# Patient Record
Sex: FEMALE | Race: WHITE | Employment: OTHER | URBAN - METROPOLITAN AREA
[De-identification: names, ages, dates, MRNs, and addresses within clinical notes are randomized per-mention and may not be internally consistent; named-entity substitution may affect disease eponyms.]

---

## 2021-06-07 ENCOUNTER — OFFICE VISIT (OUTPATIENT)
Dept: FAMILY MEDICINE CLINIC | Age: 66
End: 2021-06-07
Payer: MEDICARE

## 2021-06-07 VITALS
DIASTOLIC BLOOD PRESSURE: 80 MMHG | WEIGHT: 205.4 LBS | SYSTOLIC BLOOD PRESSURE: 130 MMHG | HEIGHT: 64 IN | HEART RATE: 84 BPM | OXYGEN SATURATION: 97 % | BODY MASS INDEX: 35.07 KG/M2

## 2021-06-07 DIAGNOSIS — F32.A DEPRESSION, UNSPECIFIED DEPRESSION TYPE: ICD-10-CM

## 2021-06-07 DIAGNOSIS — Z76.89 ENCOUNTER TO ESTABLISH CARE: ICD-10-CM

## 2021-06-07 DIAGNOSIS — E78.5 HYPERLIPIDEMIA, UNSPECIFIED HYPERLIPIDEMIA TYPE: ICD-10-CM

## 2021-06-07 DIAGNOSIS — I10 HYPERTENSION, UNSPECIFIED TYPE: Primary | ICD-10-CM

## 2021-06-07 DIAGNOSIS — E11.9 TYPE 2 DIABETES MELLITUS WITHOUT COMPLICATION, WITHOUT LONG-TERM CURRENT USE OF INSULIN (HCC): ICD-10-CM

## 2021-06-07 DIAGNOSIS — Z13.29 SCREENING FOR THYROID DISORDER: ICD-10-CM

## 2021-06-07 DIAGNOSIS — E55.9 VITAMIN D DEFICIENCY: ICD-10-CM

## 2021-06-07 DIAGNOSIS — Z13.21 ENCOUNTER FOR VITAMIN DEFICIENCY SCREENING: ICD-10-CM

## 2021-06-07 DIAGNOSIS — Z11.59 ENCOUNTER FOR HEPATITIS C SCREENING TEST FOR LOW RISK PATIENT: ICD-10-CM

## 2021-06-07 LAB
A/G RATIO: 1.8 (ref 1.1–2.2)
ALBUMIN SERPL-MCNC: 5 G/DL (ref 3.4–5)
ALP BLD-CCNC: 127 U/L (ref 40–129)
ALT SERPL-CCNC: 19 U/L (ref 10–40)
ANION GAP SERPL CALCULATED.3IONS-SCNC: 20 MMOL/L (ref 3–16)
AST SERPL-CCNC: 19 U/L (ref 15–37)
BILIRUB SERPL-MCNC: 0.3 MG/DL (ref 0–1)
BUN BLDV-MCNC: 18 MG/DL (ref 7–20)
CALCIUM SERPL-MCNC: 10.3 MG/DL (ref 8.3–10.6)
CHLORIDE BLD-SCNC: 102 MMOL/L (ref 99–110)
CHOLESTEROL, TOTAL: 164 MG/DL (ref 0–199)
CO2: 21 MMOL/L (ref 21–32)
CREAT SERPL-MCNC: 0.8 MG/DL (ref 0.6–1.2)
GFR AFRICAN AMERICAN: >60
GFR NON-AFRICAN AMERICAN: >60
GLOBULIN: 2.8 G/DL
GLUCOSE BLD-MCNC: 153 MG/DL (ref 70–99)
HCT VFR BLD CALC: 40 % (ref 36–48)
HDLC SERPL-MCNC: 52 MG/DL (ref 40–60)
HEMOGLOBIN: 14 G/DL (ref 12–16)
HEPATITIS C ANTIBODY INTERPRETATION: NORMAL
LDL CHOLESTEROL CALCULATED: 87 MG/DL
MCH RBC QN AUTO: 32.5 PG (ref 26–34)
MCHC RBC AUTO-ENTMCNC: 35 G/DL (ref 31–36)
MCV RBC AUTO: 93 FL (ref 80–100)
PDW BLD-RTO: 13.7 % (ref 12.4–15.4)
PLATELET # BLD: 419 K/UL (ref 135–450)
PMV BLD AUTO: 8.3 FL (ref 5–10.5)
POTASSIUM SERPL-SCNC: 4.4 MMOL/L (ref 3.5–5.1)
RBC # BLD: 4.3 M/UL (ref 4–5.2)
SODIUM BLD-SCNC: 143 MMOL/L (ref 136–145)
TOTAL PROTEIN: 7.8 G/DL (ref 6.4–8.2)
TRIGL SERPL-MCNC: 125 MG/DL (ref 0–150)
TSH REFLEX: 0.43 UIU/ML (ref 0.27–4.2)
VITAMIN D 25-HYDROXY: 46.1 NG/ML
VLDLC SERPL CALC-MCNC: 25 MG/DL
WBC # BLD: 8.2 K/UL (ref 4–11)

## 2021-06-07 PROCEDURE — G8427 DOCREV CUR MEDS BY ELIG CLIN: HCPCS | Performed by: NURSE PRACTITIONER

## 2021-06-07 PROCEDURE — G8417 CALC BMI ABV UP PARAM F/U: HCPCS | Performed by: NURSE PRACTITIONER

## 2021-06-07 PROCEDURE — 4040F PNEUMOC VAC/ADMIN/RCVD: CPT | Performed by: NURSE PRACTITIONER

## 2021-06-07 PROCEDURE — 36415 COLL VENOUS BLD VENIPUNCTURE: CPT | Performed by: NURSE PRACTITIONER

## 2021-06-07 PROCEDURE — 3017F COLORECTAL CA SCREEN DOC REV: CPT | Performed by: NURSE PRACTITIONER

## 2021-06-07 PROCEDURE — 3046F HEMOGLOBIN A1C LEVEL >9.0%: CPT | Performed by: NURSE PRACTITIONER

## 2021-06-07 PROCEDURE — 1090F PRES/ABSN URINE INCON ASSESS: CPT | Performed by: NURSE PRACTITIONER

## 2021-06-07 PROCEDURE — 1036F TOBACCO NON-USER: CPT | Performed by: NURSE PRACTITIONER

## 2021-06-07 PROCEDURE — 2022F DILAT RTA XM EVC RTNOPTHY: CPT | Performed by: NURSE PRACTITIONER

## 2021-06-07 PROCEDURE — 99204 OFFICE O/P NEW MOD 45 MIN: CPT | Performed by: NURSE PRACTITIONER

## 2021-06-07 PROCEDURE — G8400 PT W/DXA NO RESULTS DOC: HCPCS | Performed by: NURSE PRACTITIONER

## 2021-06-07 PROCEDURE — 1123F ACP DISCUSS/DSCN MKR DOCD: CPT | Performed by: NURSE PRACTITIONER

## 2021-06-07 RX ORDER — PAROXETINE HYDROCHLORIDE 20 MG/1
20 TABLET, FILM COATED ORAL EVERY MORNING
COMMUNITY
Start: 2021-04-05 | End: 2021-06-07 | Stop reason: SDUPTHER

## 2021-06-07 RX ORDER — PAROXETINE HYDROCHLORIDE 20 MG/1
20 TABLET, FILM COATED ORAL EVERY MORNING
Qty: 90 TABLET | Refills: 1 | Status: SHIPPED | OUTPATIENT
Start: 2021-06-07 | End: 2021-12-08 | Stop reason: SDUPTHER

## 2021-06-07 RX ORDER — GEMFIBROZIL 600 MG/1
600 TABLET, FILM COATED ORAL 2 TIMES DAILY
Qty: 180 TABLET | Refills: 1 | Status: SHIPPED | OUTPATIENT
Start: 2021-06-07 | End: 2021-12-08 | Stop reason: SDUPTHER

## 2021-06-07 RX ORDER — ATORVASTATIN CALCIUM 20 MG/1
20 TABLET, FILM COATED ORAL DAILY
Qty: 90 TABLET | Refills: 1 | Status: SHIPPED | OUTPATIENT
Start: 2021-06-07 | End: 2021-12-08 | Stop reason: SDUPTHER

## 2021-06-07 RX ORDER — GLIPIZIDE 10 MG/1
10 TABLET, FILM COATED, EXTENDED RELEASE ORAL DAILY
COMMUNITY
Start: 2021-03-01 | End: 2021-06-07 | Stop reason: SDUPTHER

## 2021-06-07 RX ORDER — METOPROLOL TARTRATE 50 MG/1
50 TABLET, FILM COATED ORAL 2 TIMES DAILY
COMMUNITY
Start: 2021-04-10 | End: 2021-06-07 | Stop reason: SDUPTHER

## 2021-06-07 RX ORDER — ATORVASTATIN CALCIUM 20 MG/1
20 TABLET, FILM COATED ORAL DAILY
COMMUNITY
Start: 2021-04-10 | End: 2021-06-07 | Stop reason: SDUPTHER

## 2021-06-07 RX ORDER — GLIPIZIDE 10 MG/1
10 TABLET, FILM COATED, EXTENDED RELEASE ORAL DAILY
Qty: 90 TABLET | Refills: 1 | Status: SHIPPED | OUTPATIENT
Start: 2021-06-07 | End: 2021-12-08 | Stop reason: SDUPTHER

## 2021-06-07 RX ORDER — GEMFIBROZIL 600 MG/1
600 TABLET, FILM COATED ORAL 2 TIMES DAILY
COMMUNITY
Start: 2021-04-10 | End: 2021-06-07 | Stop reason: SDUPTHER

## 2021-06-07 RX ORDER — METOPROLOL TARTRATE 50 MG/1
50 TABLET, FILM COATED ORAL 2 TIMES DAILY
Qty: 180 TABLET | Refills: 1 | Status: SHIPPED | OUTPATIENT
Start: 2021-06-07 | End: 2021-12-08 | Stop reason: SDUPTHER

## 2021-06-07 ASSESSMENT — PATIENT HEALTH QUESTIONNAIRE - PHQ9
1. LITTLE INTEREST OR PLEASURE IN DOING THINGS: 0
2. FEELING DOWN, DEPRESSED OR HOPELESS: 2
SUM OF ALL RESPONSES TO PHQ QUESTIONS 1-9: 2
SUM OF ALL RESPONSES TO PHQ9 QUESTIONS 1 & 2: 2
SUM OF ALL RESPONSES TO PHQ QUESTIONS 1-9: 2
SUM OF ALL RESPONSES TO PHQ QUESTIONS 1-9: 2

## 2021-06-07 NOTE — PROGRESS NOTES
mouth every morning 90 tablet 1     No current facility-administered medications for this visit. Patient's past medical history, surgical history, family history, medications,  and allergies  were all reviewed and updated as appropriate today. Review of Systems   All other systems reviewed and are negative. Physical Exam  Vitals and nursing note reviewed. Constitutional:       Appearance: Normal appearance. She is well-developed. She is obese. HENT:      Head: Normocephalic. Right Ear: External ear normal.      Left Ear: External ear normal.      Nose: Nose normal.      Mouth/Throat:      Mouth: Mucous membranes are moist.      Pharynx: Oropharynx is clear. No oropharyngeal exudate or posterior oropharyngeal erythema. Eyes:      Extraocular Movements: Extraocular movements intact. Conjunctiva/sclera: Conjunctivae normal.      Pupils: Pupils are equal, round, and reactive to light. Neck:      Thyroid: No thyromegaly. Cardiovascular:      Rate and Rhythm: Normal rate and regular rhythm. Heart sounds: Normal heart sounds. No murmur heard. Pulmonary:      Effort: Pulmonary effort is normal.      Breath sounds: Normal breath sounds. No wheezing or rhonchi. Abdominal:      General: Bowel sounds are normal.      Palpations: Abdomen is soft. There is no mass. Tenderness: There is no abdominal tenderness. Musculoskeletal:         General: Normal range of motion. Cervical back: Normal range of motion and neck supple. No tenderness. Lymphadenopathy:      Cervical: No cervical adenopathy. Skin:     General: Skin is warm and dry. Neurological:      General: No focal deficit present. Mental Status: She is alert and oriented to person, place, and time. Psychiatric:         Mood and Affect: Mood normal.         Behavior: Behavior normal.         Thought Content:  Thought content normal.         Judgment: Judgment normal.       Vitals:    06/07/21 1022   BP: 130/80   Pulse: 84   SpO2: 97%       Assessment:  Encounter Diagnoses   Name Primary?  Hypertension, unspecified type Yes    Encounter to establish care     Hyperlipidemia, unspecified hyperlipidemia type     Type 2 diabetes mellitus without complication, without long-term current use of insulin ([facility]     Depression, unspecified depression type     Encounter for hepatitis C screening test for low risk patient     Screening for thyroid disorder     Encounter for vitamin deficiency screening        Controlled SubstancesMonitoring:  NA    Plan:  1. Hypertension, unspecified type  Stable  - metoprolol tartrate (LOPRESSOR) 50 MG tablet; Take 1 tablet by mouth 2 times daily  Dispense: 180 tablet; Refill: 1  - CBC  - Comprehensive Metabolic Panel    2. Encounter to establish care  Establish care    3. Hyperlipidemia, unspecified hyperlipidemia type  Stable  - atorvastatin (LIPITOR) 20 MG tablet; Take 1 tablet by mouth daily  Dispense: 90 tablet; Refill: 1  - gemfibrozil (LOPID) 600 MG tablet; Take 1 tablet by mouth 2 times daily  Dispense: 180 tablet; Refill: 1  - CBC  - Lipid Panel    4. Type 2 diabetes mellitus without complication, without long-term current use of insulin ([facility])  Stable  - glipiZIDE (GLUCOTROL XL) 10 MG extended release tablet; Take 1 tablet by mouth daily  Dispense: 90 tablet; Refill: 1  - metFORMIN (GLUCOPHAGE) 500 MG tablet; Take 2 tablets by mouth 2 times daily (with meals)  Dispense: 360 tablet; Refill: 1  - Comprehensive Metabolic Panel  - Hemoglobin A1C    5. Depression, unspecified depression type  Stable  - PARoxetine (PAXIL) 20 MG tablet; Take 1 tablet by mouth every morning  Dispense: 90 tablet; Refill: 1    6. Encounter for hepatitis C screening test for low risk patient  - Hepatitis C Antibody    7. Screening for thyroid disorder  - TSH with Reflex    8.  Encounter for vitamin deficiency screening  - Vitamin D 25 Hydroxy    I told her we would call the results of her labs to her tomorrow  Follow-up in 6 months for AWV    INA Michaels CNP, NAHUN    Reviewed treatment plan with patient. Patient verbalized understanding to treatment plan and questions were answered. [address] [place] [address] Ame Solomon Peña.  [address] [place] [address]

## 2021-06-08 LAB
ESTIMATED AVERAGE GLUCOSE: 142.7 MG/DL
HBA1C MFR BLD: 6.6 %

## 2021-11-24 ENCOUNTER — PATIENT MESSAGE (OUTPATIENT)
Dept: FAMILY MEDICINE CLINIC | Age: 66
End: 2021-11-24

## 2021-11-29 NOTE — TELEPHONE ENCOUNTER
From: Kenji Sung  To: Langladediego Gutierrez  Sent: 11/24/2021 7:53 PM EST  Subject: Non-Urgent Medical Question    I am having wellness tests done on 12/8. I would like to know what tests I will have and where these tests will be done. I can't drive and need to arrange for transportation if it is out of that office.

## 2021-12-08 DIAGNOSIS — E78.5 HYPERLIPIDEMIA, UNSPECIFIED HYPERLIPIDEMIA TYPE: ICD-10-CM

## 2021-12-08 DIAGNOSIS — F32.A DEPRESSION, UNSPECIFIED DEPRESSION TYPE: ICD-10-CM

## 2021-12-08 DIAGNOSIS — E11.9 TYPE 2 DIABETES MELLITUS WITHOUT COMPLICATION, WITHOUT LONG-TERM CURRENT USE OF INSULIN (HCC): ICD-10-CM

## 2021-12-08 DIAGNOSIS — I10 HYPERTENSION, UNSPECIFIED TYPE: ICD-10-CM

## 2021-12-08 RX ORDER — GEMFIBROZIL 600 MG/1
600 TABLET, FILM COATED ORAL 2 TIMES DAILY
Qty: 180 TABLET | Refills: 1 | Status: SHIPPED | OUTPATIENT
Start: 2021-12-08 | End: 2022-06-14 | Stop reason: SDUPTHER

## 2021-12-08 RX ORDER — PAROXETINE HYDROCHLORIDE 20 MG/1
20 TABLET, FILM COATED ORAL EVERY MORNING
Qty: 90 TABLET | Refills: 1 | Status: SHIPPED | OUTPATIENT
Start: 2021-12-08 | End: 2022-06-14 | Stop reason: SDUPTHER

## 2021-12-08 RX ORDER — METOPROLOL TARTRATE 50 MG/1
50 TABLET, FILM COATED ORAL 2 TIMES DAILY
Qty: 180 TABLET | Refills: 1 | Status: SHIPPED | OUTPATIENT
Start: 2021-12-08 | End: 2022-06-14 | Stop reason: SDUPTHER

## 2021-12-08 RX ORDER — GLIPIZIDE 10 MG/1
10 TABLET, FILM COATED, EXTENDED RELEASE ORAL DAILY
Qty: 90 TABLET | Refills: 1 | Status: SHIPPED | OUTPATIENT
Start: 2021-12-08 | End: 2022-06-14 | Stop reason: SDUPTHER

## 2021-12-08 RX ORDER — ATORVASTATIN CALCIUM 20 MG/1
20 TABLET, FILM COATED ORAL DAILY
Qty: 90 TABLET | Refills: 1 | Status: SHIPPED | OUTPATIENT
Start: 2021-12-08 | End: 2022-06-14 | Stop reason: SDUPTHER

## 2021-12-08 NOTE — TELEPHONE ENCOUNTER
----- Message from Cherie Nasir sent at 12/8/2021 10:36 AM EST -----  Subject: Refill Request    QUESTIONS  Name of Medication? PARoxetine (PAXIL) 20 MG tablet  Patient-reported dosage and instructions? 20 mg tablet one tablet daily  How many days do you have left? 14  Preferred Pharmacy? [facility] phone number (if available)? [phone]  ---------------------------------------------------------------------------  --------------,  Name of Medication? metFORMIN (GLUCOPHAGE) 500 MG tablet  Patient-reported dosage and instructions? 500 mg tablet one four tablets   daily  How many days do you have left? 28  Preferred Pharmacy? [facility] phone number (if available)? [phone]  ---------------------------------------------------------------------------  --------------,  Name of Medication? atorvastatin (LIPITOR) 20 MG tablet  Patient-reported dosage and instructions? 20 mg tablet on tablet daily  How many days do you have left? 14  Preferred Pharmacy? [facility] phone number (if available)? [phone]  ---------------------------------------------------------------------------  --------------,  Name of Medication? glipiZIDE (GLUCOTROL XL) 10 MG extended release tablet  Patient-reported dosage and instructions? 10 mg tablet two daily  How many days do you have left? 0  Preferred Pharmacy? [facility] phone number (if available)? [phone]  ---------------------------------------------------------------------------  --------------,  Name of Medication? gemfibrozil (LOPID) 600 MG tablet  Patient-reported dosage and instructions? 600 mg tablet two tablets daily  How many days do you have left? 0  Preferred Pharmacy?  [facility] phone number (if available)? [phone]  ---------------------------------------------------------------------------  --------------,  Name

## 2021-12-08 NOTE — TELEPHONE ENCOUNTER
Last Office Visit  -  6/7/21  Next Office Visit  -  3/8/22 TL to transfer care    Last Filled  -    Last UDS -    Contract -

## 2021-12-08 NOTE — TELEPHONE ENCOUNTER
Patient phoned concerned her meds will not get filled. She cancelled her appointment today afraid she might fall on the ice. She rescheduled to 3/8/2022.   Patient is requesting a call once rx are sent to [facility] mail order

## 2021-12-23 ENCOUNTER — TELEPHONE (OUTPATIENT)
Dept: FAMILY MEDICINE CLINIC | Age: 66
End: 2021-12-23

## 2021-12-23 DIAGNOSIS — Z12.31 ENCOUNTER FOR SCREENING MAMMOGRAM FOR MALIGNANT NEOPLASM OF BREAST: Primary | ICD-10-CM

## 2021-12-23 DIAGNOSIS — Z92.89 HISTORY OF PAPANICOLAOU SMEAR OF CERVIX: ICD-10-CM

## 2021-12-23 NOTE — TELEPHONE ENCOUNTER
Patient was phoned to clarify message. Patient is requesting a referral for a preventive mammogram and gynecology . Patient move to the area in April 2020 and is trying to get all her preventative appointment taken care of.        YUMIKO with Erma 3/8/22

## 2021-12-23 NOTE — TELEPHONE ENCOUNTER
----- Message from Iesha Castorena sent at 12/23/2021 11:09 AM EST -----  Subject: Message to Provider    QUESTIONS  Information for Provider? [address], patient scheduled appt   01/04/22 for [facility] MAMMO RM 2-- has [employer] MEDICARE, gold plus--- they will   cover the wendy if it is done in doctor's office, if done at [facility]   she has a 35.00 specialist fee, needs referral --- patient needs   appointment sooner than 3/8, wants referral to obgyn and one for a wendy  ---------------------------------------------------------------------------  --------------  [address]  What is the best way for the office to contact you? OK to leave message on   voicemail  Preferred Call Back Phone Number? [phone]  ---------------------------------------------------------------------------  --------------  SCRIPT ANSWERS  Relationship to Patient?  Self

## 2022-01-04 ENCOUNTER — HOSPITAL ENCOUNTER (OUTPATIENT)
Dept: WOMENS IMAGING | Age: 67
Discharge: HOME | End: 2022-01-04
Payer: MEDICARE

## 2022-01-04 VITALS — HEIGHT: 60 IN | WEIGHT: 220 LBS | BODY MASS INDEX: 43.19 KG/M2

## 2022-01-04 DIAGNOSIS — Z12.31 ENCOUNTER FOR SCREENING MAMMOGRAM FOR MALIGNANT NEOPLASM OF BREAST: ICD-10-CM

## 2022-01-04 PROCEDURE — 77067 SCR MAMMO BI INCL CAD: CPT

## 2022-01-28 ENCOUNTER — TELEPHONE (OUTPATIENT)
Dept: FAMILY MEDICINE CLINIC | Age: 67
End: 2022-01-28

## 2022-03-08 ENCOUNTER — OFFICE VISIT (OUTPATIENT)
Dept: FAMILY MEDICINE CLINIC | Age: 67
End: 2022-03-08
Payer: MEDICARE

## 2022-03-08 ENCOUNTER — CARE COORDINATION (OUTPATIENT)
Dept: CARE COORDINATION | Age: 67
End: 2022-03-08

## 2022-03-08 VITALS
HEART RATE: 71 BPM | DIASTOLIC BLOOD PRESSURE: 78 MMHG | WEIGHT: 209.9 LBS | BODY MASS INDEX: 41.21 KG/M2 | OXYGEN SATURATION: 97 % | HEIGHT: 60 IN | SYSTOLIC BLOOD PRESSURE: 124 MMHG

## 2022-03-08 DIAGNOSIS — Z13.31 POSITIVE DEPRESSION SCREENING: ICD-10-CM

## 2022-03-08 DIAGNOSIS — Z23 NEED FOR VACCINATION: ICD-10-CM

## 2022-03-08 DIAGNOSIS — E11.69 TYPE 2 DIABETES MELLITUS WITH OTHER SPECIFIED COMPLICATION, WITHOUT LONG-TERM CURRENT USE OF INSULIN (HCC): ICD-10-CM

## 2022-03-08 DIAGNOSIS — E66.01 OBESITY, CLASS III, BMI 40-49.9 (MORBID OBESITY) (HCC): ICD-10-CM

## 2022-03-08 DIAGNOSIS — F32.A DEPRESSION, UNSPECIFIED DEPRESSION TYPE: ICD-10-CM

## 2022-03-08 DIAGNOSIS — Z78.0 POSTMENOPAUSAL: ICD-10-CM

## 2022-03-08 DIAGNOSIS — K63.5 POLYP OF COLON, UNSPECIFIED PART OF COLON, UNSPECIFIED TYPE: ICD-10-CM

## 2022-03-08 DIAGNOSIS — I10 HYPERTENSION, UNSPECIFIED TYPE: ICD-10-CM

## 2022-03-08 DIAGNOSIS — J44.9 CHRONIC OBSTRUCTIVE PULMONARY DISEASE, UNSPECIFIED COPD TYPE (HCC): ICD-10-CM

## 2022-03-08 DIAGNOSIS — Z00.00 WELCOME TO MEDICARE PREVENTIVE VISIT: Primary | ICD-10-CM

## 2022-03-08 PROBLEM — E11.9 TYPE 2 DIABETES MELLITUS (HCC): Status: ACTIVE | Noted: 2022-03-08

## 2022-03-08 PROBLEM — E11.9 TYPE 2 DIABETES MELLITUS WITHOUT COMPLICATION, WITHOUT LONG-TERM CURRENT USE OF INSULIN (HCC): Status: ACTIVE | Noted: 2022-03-08

## 2022-03-08 LAB
A/G RATIO: 2 (ref 1.1–2.2)
ALBUMIN SERPL-MCNC: 4.7 G/DL (ref 3.4–5)
ALP BLD-CCNC: 127 U/L (ref 40–129)
ALT SERPL-CCNC: 30 U/L (ref 10–40)
ANION GAP SERPL CALCULATED.3IONS-SCNC: 20 MMOL/L (ref 3–16)
AST SERPL-CCNC: 24 U/L (ref 15–37)
BILIRUB SERPL-MCNC: 0.3 MG/DL (ref 0–1)
BUN BLDV-MCNC: 19 MG/DL (ref 7–20)
CALCIUM SERPL-MCNC: 10.1 MG/DL (ref 8.3–10.6)
CHLORIDE BLD-SCNC: 98 MMOL/L (ref 99–110)
CO2: 21 MMOL/L (ref 21–32)
CREAT SERPL-MCNC: 0.8 MG/DL (ref 0.6–1.2)
GFR AFRICAN AMERICAN: >60
GFR NON-AFRICAN AMERICAN: >60
GLUCOSE BLD-MCNC: 356 MG/DL (ref 70–99)
POTASSIUM SERPL-SCNC: 4.6 MMOL/L (ref 3.5–5.1)
SODIUM BLD-SCNC: 139 MMOL/L (ref 136–145)
TOTAL PROTEIN: 7.1 G/DL (ref 6.4–8.2)

## 2022-03-08 PROCEDURE — 1090F PRES/ABSN URINE INCON ASSESS: CPT | Performed by: REGISTERED NURSE

## 2022-03-08 PROCEDURE — G8417 CALC BMI ABV UP PARAM F/U: HCPCS | Performed by: REGISTERED NURSE

## 2022-03-08 PROCEDURE — 1123F ACP DISCUSS/DSCN MKR DOCD: CPT | Performed by: REGISTERED NURSE

## 2022-03-08 PROCEDURE — G8400 PT W/DXA NO RESULTS DOC: HCPCS | Performed by: REGISTERED NURSE

## 2022-03-08 PROCEDURE — 4040F PNEUMOC VAC/ADMIN/RCVD: CPT | Performed by: REGISTERED NURSE

## 2022-03-08 PROCEDURE — 1036F TOBACCO NON-USER: CPT | Performed by: REGISTERED NURSE

## 2022-03-08 PROCEDURE — 99214 OFFICE O/P EST MOD 30 MIN: CPT | Performed by: REGISTERED NURSE

## 2022-03-08 PROCEDURE — G8484 FLU IMMUNIZE NO ADMIN: HCPCS | Performed by: REGISTERED NURSE

## 2022-03-08 PROCEDURE — 36415 COLL VENOUS BLD VENIPUNCTURE: CPT | Performed by: REGISTERED NURSE

## 2022-03-08 PROCEDURE — G8427 DOCREV CUR MEDS BY ELIG CLIN: HCPCS | Performed by: REGISTERED NURSE

## 2022-03-08 PROCEDURE — G0402 INITIAL PREVENTIVE EXAM: HCPCS | Performed by: REGISTERED NURSE

## 2022-03-08 PROCEDURE — 3046F HEMOGLOBIN A1C LEVEL >9.0%: CPT | Performed by: REGISTERED NURSE

## 2022-03-08 PROCEDURE — 3023F SPIROM DOC REV: CPT | Performed by: REGISTERED NURSE

## 2022-03-08 PROCEDURE — 3017F COLORECTAL CA SCREEN DOC REV: CPT | Performed by: REGISTERED NURSE

## 2022-03-08 PROCEDURE — 2022F DILAT RTA XM EVC RTNOPTHY: CPT | Performed by: REGISTERED NURSE

## 2022-03-08 RX ORDER — NYSTATIN 100000 U/G
CREAM TOPICAL PRN
COMMUNITY
Start: 2022-01-20

## 2022-03-08 ASSESSMENT — PATIENT HEALTH QUESTIONNAIRE - PHQ9
SUM OF ALL RESPONSES TO PHQ QUESTIONS 1-9: 16
10. IF YOU CHECKED OFF ANY PROBLEMS, HOW DIFFICULT HAVE THESE PROBLEMS MADE IT FOR YOU TO DO YOUR WORK, TAKE CARE OF THINGS AT HOME, OR GET ALONG WITH OTHER PEOPLE: 1
3. TROUBLE FALLING OR STAYING ASLEEP: 3
SUM OF ALL RESPONSES TO PHQ QUESTIONS 1-9: 16
8. MOVING OR SPEAKING SO SLOWLY THAT OTHER PEOPLE COULD HAVE NOTICED. OR THE OPPOSITE, BEING SO FIGETY OR RESTLESS THAT YOU HAVE BEEN MOVING AROUND A LOT MORE THAN USUAL: 2
5. POOR APPETITE OR OVEREATING: 2
1. LITTLE INTEREST OR PLEASURE IN DOING THINGS: 2
2. FEELING DOWN, DEPRESSED OR HOPELESS: 2
SUM OF ALL RESPONSES TO PHQ9 QUESTIONS 1 & 2: 4
7. TROUBLE CONCENTRATING ON THINGS, SUCH AS READING THE NEWSPAPER OR WATCHING TELEVISION: 0
6. FEELING BAD ABOUT YOURSELF - OR THAT YOU ARE A FAILURE OR HAVE LET YOURSELF OR YOUR FAMILY DOWN: 3
SUM OF ALL RESPONSES TO PHQ QUESTIONS 1-9: 16
4. FEELING TIRED OR HAVING LITTLE ENERGY: 2
9. THOUGHTS THAT YOU WOULD BE BETTER OFF DEAD, OR OF HURTING YOURSELF: 0
SUM OF ALL RESPONSES TO PHQ QUESTIONS 1-9: 16

## 2022-03-08 ASSESSMENT — ENCOUNTER SYMPTOMS
DIARRHEA: 0
NAUSEA: 0
CONSTIPATION: 0
COUGH: 0
ABDOMINAL PAIN: 0
SHORTNESS OF BREATH: 0

## 2022-03-08 ASSESSMENT — LIFESTYLE VARIABLES: HOW OFTEN DO YOU HAVE A DRINK CONTAINING ALCOHOL: NEVER

## 2022-03-08 NOTE — PROGRESS NOTES
Patient: Joni Rahman is a 78 y.o. female who presents today with the following Chief Complaint(s):  Chief Complaint   Patient presents with    Medicare AWV    New Patient       Assessment/Plan:  1. Welcome to Medicare preventive visit  See additional note for AWV.    2. Obesity, Class III, BMI 40-49.9 (morbid obesity) ([facility]  Increase exercise. Dietary changes. 3. Postmenopausal    - DEXA BONE DENSITY 2 SITES; Future    4. Type 2 diabetes mellitus with other specified complication, without long-term current use of insulin (HCC)  Stable. - Hemoglobin A1C    5. Hypertension, unspecified type  Stable. Continue current medications  - Comprehensive Metabolic Panel    6. Depression, unspecified depression type  Continue therapy every other week. She does not want to increase her medication at this time. Encouraged her to seek hobbies or positive social groups to interact with. Will refer to care coordinator for resources    7. Chronic obstructive pulmonary disease, unspecified COPD type ([facility]  Stable. 8. Polyp of colon, unspecified part of colon, unspecified type  Schedule follow-up with gastroenterology. - [facility] - Sanchez Sosa DO, Gastroenterology, [facility]    9. Positive depression screening  Plan is to refer to care coordinator for resources    10. Need for vaccination  She is due for Tdap and pneumococcal.  She is also due for her second shingles vaccination. She will go to her pharmacy for vaccinations. Return in 6 months (on 9/8/2022), or if symptoms worsen or fail to improve, for Medicare Annual Wellness Visit in 1 year, Diabetes follow up, BP check. HPI:     She is here to establish care. She is also due for an AWV. She would like to know which vaccination she is due for. She moved to this area from [place] in April 2021. She says she has had 2 or 3 colonoscopies previously. She has a history of colon polyps. She is not having current issues with her bowels.     She does have urinary incontinence. She says it affects her approximately 1 time per day. She feels that she has this managed well at this time. She is having issues with depression. She declines an increase in her medication. She says most of her issues revolve around financial problems and feeling lonely. Current Outpatient Medications   Medication Sig Dispense Refill    nystatin (MYCOSTATIN) 199327 UNIT/GM cream       atorvastatin (LIPITOR) 20 MG tablet Take 1 tablet by mouth daily 90 tablet 1    gemfibrozil (LOPID) 600 MG tablet Take 1 tablet by mouth 2 times daily 180 tablet 1    glipiZIDE (GLUCOTROL XL) 10 MG extended release tablet Take 1 tablet by mouth daily 90 tablet 1    metFORMIN (GLUCOPHAGE) 500 MG tablet Take 2 tablets by mouth 2 times daily (with meals) 360 tablet 1    metoprolol tartrate (LOPRESSOR) 50 MG tablet Take 1 tablet by mouth 2 times daily 180 tablet 1    PARoxetine (PAXIL) 20 MG tablet Take 1 tablet by mouth every morning 90 tablet 1     No current facility-administered medications for this visit. Review of Systems   Constitutional: Negative for fatigue and fever. HENT: Negative. Eyes: Negative for visual disturbance. Respiratory: Negative for cough and shortness of breath. Cardiovascular: Negative for chest pain and palpitations. Gastrointestinal: Negative for abdominal pain, constipation, diarrhea and nausea. Endocrine: Negative. Genitourinary:        Urinary incontinence. Musculoskeletal: Negative. Skin: Negative. Neurological: Negative for dizziness, light-headedness and headaches. Psychiatric/Behavioral: Positive for dysphoric mood. Negative for self-injury, sleep disturbance and suicidal ideas. The patient is not nervous/anxious. Vitals:    03/08/22 1049   BP: 124/78   Pulse: 71   SpO2: 97%   Weight: 209 lb 14.4 oz (95.2 kg)   Height: 5' (1.524 m)     Physical Exam  Vitals reviewed.    Constitutional:       General: She is not in acute distress. Appearance: Normal appearance. She is not ill-appearing. HENT:      Head: Normocephalic and atraumatic. Right Ear: Tympanic membrane, ear canal and external ear normal. There is no impacted cerumen. Left Ear: Tympanic membrane, ear canal and external ear normal. There is no impacted cerumen. Nose: Nose normal. No congestion or rhinorrhea. Mouth/Throat:      Mouth: Mucous membranes are moist.      Pharynx: Oropharynx is clear. No oropharyngeal exudate or posterior oropharyngeal erythema. Eyes:      General:         Right eye: No discharge. Left eye: No discharge. Extraocular Movements: Extraocular movements intact. Conjunctiva/sclera: Conjunctivae normal.      Pupils: Pupils are equal, round, and reactive to light. Cardiovascular:      Rate and Rhythm: Normal rate and regular rhythm. Pulses: Normal pulses. Heart sounds: Normal heart sounds. No murmur heard. No friction rub. No gallop. Pulmonary:      Effort: Pulmonary effort is normal.      Breath sounds: Normal breath sounds. No stridor. No wheezing, rhonchi or rales. Abdominal:      General: Abdomen is flat. There is no distension. Palpations: Abdomen is soft. There is no mass. Tenderness: There is no abdominal tenderness. There is no guarding or rebound. Hernia: No hernia is present. Musculoskeletal:         General: Normal range of motion. Cervical back: Normal range of motion and neck supple. No tenderness. Right lower leg: No edema. Left lower leg: No edema. Lymphadenopathy:      Cervical: No cervical adenopathy. Skin:     General: Skin is warm and dry. Capillary Refill: Capillary refill takes less than 2 seconds. Coloration: Skin is not jaundiced or pale. Findings: No erythema or rash. Neurological:      General: No focal deficit present. Mental Status: She is alert and oriented to person, place, and time.    Psychiatric:

## 2022-03-08 NOTE — PATIENT INSTRUCTIONS
Personalized Preventive Plan for Iza Tillman - 3/8/2022  Medicare offers a range of preventive health benefits. Some of the tests and screenings are paid in full while other may be subject to a deductible, co-insurance, and/or copay. Some of these benefits include a comprehensive review of your medical history including lifestyle, illnesses that may run in your family, and various assessments and screenings as appropriate. After reviewing your medical record and screening and assessments performed today your provider may have ordered immunizations, labs, imaging, and/or referrals for you. A list of these orders (if applicable) as well as your Preventive Care list are included within your After Visit Summary for your review. Other Preventive Recommendations:    · A preventive eye exam performed by an eye specialist is recommended every 1-2 years to screen for glaucoma; cataracts, macular degeneration, and other eye disorders. · A preventive dental visit is recommended every 6 months. · Try to get at least 150 minutes of exercise per week or 10,000 steps per day on a pedometer . · Order or download the FREE \"Exercise & Physical Activity: Your Everyday Guide\" from [employer]. Call 2-745.530.4082 or search [employer] online. · You need 6191-8002 mg of calcium and 4180-8740 IU of vitamin D per day. It is possible to meet your calcium requirement with diet alone, but a vitamin D supplement is usually necessary to meet this goal.  · When exposed to the sun, use a sunscreen that protects against both UVA and UVB radiation with an SPF of 30 or greater. Reapply every 2 to 3 hours or after sweating, drying off with a towel, or swimming. · Always wear a seat belt when traveling in a car. Always wear a helmet when riding a bicycle or motorcycle.     Due for Tdap, 2nd dose of Shingles, Pneumococcal vaccines

## 2022-03-08 NOTE — CARE COORDINATION
ACM did a reach out to patient per PCP referral for social connections. ACM introduced CC and ACM role. Patient is a friendly 78year old female who moved to the area around a year ago. She lives with her disabled daughter (44yrs). Patient does not drive, due to financial reasons of not owing a vehicle, and does not have a license. Patient is on a fix income with custodial. At this time patient has no services though the [facility]. Patient is able to afford and take her medications with no barriers currently. Patient is a well controlled diabetic current A1C was 6.2. ACM asked patient what connections she was looking for. Patient would like community resources for local senior activities and transportation. ACM advised patient she will do a [facility] referral and send other resources like [employer] to take some home gym classes and [employer] for senior center programs. Also sent the link for [employer] of the elderly [place]. [facility] provided the patient with her contact information to reach out if she had any questions. Patient thanked [facility] for the call.

## 2022-03-08 NOTE — Clinical Note
Patient moved to the area less than a year ago. She is having difficulty making social connections.   She would benefit from resources

## 2022-03-08 NOTE — PROGRESS NOTES
Medicare Annual Wellness Visit    Yane Garcia is here for Medicare AWV and New Patient    [address] [facility] was seen today for medicare awv and new patient. Diagnoses and all orders for this visit:    Welcome to Medicare preventive visit    Obesity, Class III, BMI 40-49.9 (morbid obesity) (HCC)    Postmenopausal  -     DEXA BONE DENSITY 2 SITES; Future    Type 2 diabetes mellitus with other specified complication, without long-term current use of insulin (HCC)  -     Hemoglobin A1C    Hypertension, unspecified type  -     Comprehensive Metabolic Panel    Depression, unspecified depression type    Chronic obstructive pulmonary disease, unspecified COPD type ([facility]    Polyp of colon, unspecified part of colon, unspecified type  -     [facility] - Tom Mart DO, Gastroenterology, [facility]    Positive depression screening         Recommendations for Preventive Services Due: see orders and patient instructions/AVS.  Recommended screening schedule for the next 5-10 years is provided to the patient in written form: see Patient Instructions/AVS.     Return in 6 months (on 9/8/2022), or if symptoms worsen or fail to improve, for Medicare Annual Wellness Visit in 1 year, Diabetes follow up, BP check. Subjective   The following acute and/or chronic problems were also addressed today:  See additional note. Patient's complete Health Risk Assessment and screening values have been reviewed and are found in Flowsheets. The following problems were reviewed today and where indicated follow up appointments were made and/or referrals ordered. Positive Risk Factor Screenings with Interventions:      Depression:  PHQ-2 Score: 4  PHQ-9 Total Score: 16    Severity:1-4 = minimal depression, 5-9 = mild depression, 10-14 = moderate depression, 15-19 = moderately severe depression, 20-27 = severe depression    Depression Interventions:  · She has a virtual therapy appointment every 2 weeks.  She says that most of her issues are related to being lonely. She has a lot of stressors related to financial and family situations. General Health and ACP:  General  In general, how would you say your health is?: Fair  In the past 7 days, have you experienced any of the following: New or Increased Pain, New or Increased Fatigue, Loneliness, Social Isolation, Stress or Anger?: (!) Yes  Select all that apply: (!) Loneliness  Do you get the social and emotional support that you need?: (!) No  Do you have a Living Will?: (!) No    Advance Directives     Power of [facility] Will ACP-Advance Directive ACP-Power of     Not on File Not on File Not on File Not on File      General Health Risk Interventions:  · she is trying to join a senior center to help with her depression. Health Habits/Nutrition:     Physical Activity: Insufficiently Active    Days of Exercise per Week: 2 days    Minutes of Exercise per Session: 30 min     Have you lost any weight without trying in the past 3 months?: No  Body mass index: (!) 40.99  Have you seen the dentist within the past year?: (!) No    Health Habits/Nutrition Interventions:  · Dental exam overdue:  patient encouraged to make appointment with his/her dentist    Hearing/Vision:  Do you or your family notice any trouble with your hearing that hasn't been managed with hearing aids?: (!) Yes  Do you have difficulty driving, watching TV, or doing any of your daily activities because of your eyesight?: No  Have you had an eye exam within the past year?: Yes  No exam data present    Hearing/Vision Interventions:  · Hearing concerns:  patient declines any further evaluation/treatment for hearing issues. She has tinnitus and manages this okay for now. Objective   Vitals:    03/08/22 1049   BP: 124/78   Pulse: 71   SpO2: 97%   Weight: 209 lb 14.4 oz (95.2 kg)   Height: 5' (1.524 m)      Body mass index is 40.99 kg/m².         General Appearance: alert and oriented to person, place and time, well developed and well- nourished, in no acute distress  Skin: warm and dry, no rash or erythema  Head: normocephalic and atraumatic  Eyes: pupils equal, round, and reactive to light, extraocular eye movements intact, conjunctivae normal  ENT: tympanic membrane, external ear and ear canal normal bilaterally, nose without deformity, nasal mucosa and turbinates normal without polyps  Neck: supple and non-tender without mass, no thyromegaly or thyroid nodules, no cervical lymphadenopathy  Pulmonary/Chest: clear to auscultation bilaterally- no wheezes, rales or rhonchi, normal air movement, no respiratory distress  Cardiovascular: normal rate, regular rhythm, normal S1 and S2, no murmurs, rubs, clicks, or gallops, distal pulses intact, no carotid bruits  Abdomen: soft, non-tender, non-distended, normal bowel sounds, no masses or organomegaly  Extremities: no cyanosis, clubbing or edema  Musculoskeletal: normal range of motion, no joint swelling, deformity or tenderness  Neurologic: reflexes normal and symmetric, no cranial nerve deficit, gait, coordination and speech normal       No Known Allergies  Prior to Visit Medications    Medication Sig Taking?  Authorizing Provider   nystatin (MYCOSTATIN) 046153 UNIT/GM cream  Yes Historical Provider, MD   atorvastatin (LIPITOR) 20 MG tablet Take 1 tablet by mouth daily Yes INA Molina CNP   gemfibrozil (LOPID) 600 MG tablet Take 1 tablet by mouth 2 times daily Yes INA Bashir CNP   glipiZIDE (GLUCOTROL XL) 10 MG extended release tablet Take 1 tablet by mouth daily Yes INA Molina CNP   metFORMIN (GLUCOPHAGE) 500 MG tablet Take 2 tablets by mouth 2 times daily (with meals) Yes INA Bashir CNP   metoprolol tartrate (LOPRESSOR) 50 MG tablet Take 1 tablet by mouth 2 times daily Yes INA Bashir CNP   PARoxetine (PAXIL) 20 MG tablet Take 1 tablet by mouth every morning Yes INA Bashir CNP CareTeam (Including outside providers/suppliers regularly involved in providing care):   Patient Care Team:  INA Cruz CNP as PCP - General (Family Medicine)  INA Cruz CNP as PCP - [facility] Empaneled Provider    Reviewed and updated this visit:  Tobacco  Allergies  Meds  Problems  Med Hx  Surg Hx  Soc Hx  Fam Hx                   PHQ-9 score today: (PHQ-9 Total Score: 16), additional evaluation and assessment performed, follow-up plan includes but not limited to: Medication management and Referral to /Specialist  for evaluation and management.

## 2022-03-09 DIAGNOSIS — E11.9 TYPE 2 DIABETES MELLITUS WITHOUT COMPLICATION, WITHOUT LONG-TERM CURRENT USE OF INSULIN (HCC): ICD-10-CM

## 2022-03-09 LAB
ESTIMATED AVERAGE GLUCOSE: 185.8 MG/DL
HBA1C MFR BLD: 8.1 %

## 2022-03-09 NOTE — TELEPHONE ENCOUNTER
I received an email back from the [facility] this morning. They contacted the patient to offer services and the patient declined services.

## 2022-03-15 ENCOUNTER — CLINICAL DOCUMENTATION (OUTPATIENT)
Dept: SPIRITUAL SERVICES | Age: 67
End: 2022-03-15

## 2022-03-23 ENCOUNTER — CLINICAL DOCUMENTATION (OUTPATIENT)
Dept: SPIRITUAL SERVICES | Age: 67
End: 2022-03-23

## 2022-03-24 ENCOUNTER — CARE COORDINATION (OUTPATIENT)
Dept: CARE COORDINATION | Age: 67
End: 2022-03-24

## 2022-03-24 SDOH — ECONOMIC STABILITY: INCOME INSECURITY: IN THE LAST 12 MONTHS, WAS THERE A TIME WHEN YOU WERE NOT ABLE TO PAY THE MORTGAGE OR RENT ON TIME?: NO

## 2022-03-24 SDOH — ECONOMIC STABILITY: HOUSING INSECURITY: IN THE LAST 12 MONTHS, HOW MANY PLACES HAVE YOU LIVED?: 1

## 2022-03-24 SDOH — ECONOMIC STABILITY: HOUSING INSECURITY
IN THE LAST 12 MONTHS, WAS THERE A TIME WHEN YOU DID NOT HAVE A STEADY PLACE TO SLEEP OR SLEPT IN A SHELTER (INCLUDING NOW)?: NO

## 2022-03-24 SDOH — ECONOMIC STABILITY: TRANSPORTATION INSECURITY
IN THE PAST 12 MONTHS, HAS LACK OF TRANSPORTATION KEPT YOU FROM MEETINGS, WORK, OR FROM GETTING THINGS NEEDED FOR DAILY LIVING?: YES

## 2022-03-24 SDOH — ECONOMIC STABILITY: FOOD INSECURITY: WITHIN THE PAST 12 MONTHS, THE FOOD YOU BOUGHT JUST DIDN'T LAST AND YOU DIDN'T HAVE MONEY TO GET MORE.: NEVER TRUE

## 2022-03-24 SDOH — ECONOMIC STABILITY: TRANSPORTATION INSECURITY
IN THE PAST 12 MONTHS, HAS THE LACK OF TRANSPORTATION KEPT YOU FROM MEDICAL APPOINTMENTS OR FROM GETTING MEDICATIONS?: YES

## 2022-03-24 SDOH — ECONOMIC STABILITY: FOOD INSECURITY: WITHIN THE PAST 12 MONTHS, YOU WORRIED THAT YOUR FOOD WOULD RUN OUT BEFORE YOU GOT MONEY TO BUY MORE.: NEVER TRUE

## 2022-03-24 ASSESSMENT — SOCIAL DETERMINANTS OF HEALTH (SDOH)
HOW HARD IS IT FOR YOU TO PAY FOR THE VERY BASICS LIKE FOOD, HOUSING, MEDICAL CARE, AND HEATING?: SOMEWHAT HARD
HOW OFTEN DO YOU GET TOGETHER WITH FRIENDS OR RELATIVES?: NEVER
HOW OFTEN DO YOU ATTEND CHURCH OR RELIGIOUS SERVICES?: NEVER
HOW OFTEN DO YOU ATTENT MEETINGS OF THE CLUB OR ORGANIZATION YOU BELONG TO?: NEVER
DO YOU BELONG TO ANY CLUBS OR ORGANIZATIONS SUCH AS CHURCH GROUPS UNIONS, FRATERNAL OR ATHLETIC GROUPS, OR SCHOOL GROUPS?: NO
IN A TYPICAL WEEK, HOW MANY TIMES DO YOU TALK ON THE PHONE WITH FAMILY, FRIENDS, OR NEIGHBORS?: ONCE A WEEK

## 2022-03-24 NOTE — CARE COORDINATION
Ambulatory Care Coordination Note  3/24/2022  CM Risk Score: 1  Charlson 10 Year Mortality Risk Score: 23%     ACC: Olu Gomez, RN    Summary Note: ACM follow up from previous call to patient to offer CC. ACM explained the role of the ACM and CC. Patient agreed to work with ACM on goals toward lowering her A1C and finding community resources. Patient was very friendly and stated that she has already started making diet modifications to lower her A1C. Patient test BS but did not have most current readings. ACM asked patient to start recording the readings so we can discuss at the next follow up. ACM advised patient she will also be sending some education for the patient to help with meal planning and healthy snack choices. Patient thanked ACM. ACM went though SDOH with patient and patient stated she is frustranted with not qualifying for any resournces. Patient has a large custodial fund that she does not have access to for the most part. She stated when she has applied for benefits they take that as income and not understanding that the patient does not have that money as far as needs right now. Patient states that she is unable to afford things like vaccines, and medical services and even her taxes due to the financial burden. Patient states that she provides care to her disabled daughter who also gets some income for disability but she does not bring in enough even to get the filtration systems she needs for her health. ACM advised patient she will send her come community resources to try and find help for things as far as vaccines and other services. Patient thanked ACM. ACM advised she will have this mailed out and that we will discuss at the next follow up. Plan     POC to PCP   Send community resources on local health department and [employer]  Send information on local [employer] and [employer]'s   [employer] transportation resource  Follow Up in two weeks         Ambulatory Care Coordination Assessment    Care Coordination Protocol  Program Enrollment: Rising Risk  Referral from Primary Care Provider: Yes  Week 1 - Initial Assessment     Do you have all of your prescriptions and are they filled?: Yes  Barriers to medication adherence: None  Are you able to afford your medications?: No     Do you have Home O2 Therapy?: No      Ability to seek help/take action for Emergent Urgent situations i.e. fire, crime, inclement weather or health crisis. : Independent  Ability to ambulate to restroom: Independent  Ability handle personal hygeine needs (bathing/dressing/grooming): Independent  Ability to manage Medications: Independent  Ability to prepare Food Preparation: Independent  Ability to maintain home (clean home, laundry): Independent  Ability to drive and/or has transportation: Dependent  Ability to do shopping: Independent  Ability to manage finances: Independent  Is patient able to live independently?: Yes     Current Housing: Private Residence                 Suggested Interventions and Community Resources  Diabetes Education: In Process (Comment: Zone Tools)    Medication Assistance Program: In Process   Senior Services: Completed   Transportation Services: In Process   Zone Management Tools: In Process                  Prior to Admission medications    Medication Sig Start Date End Date Taking?  Authorizing Provider   nystatin (MYCOSTATIN) 784209 UNIT/GM cream  1/20/22   Historical Provider, MD   atorvastatin (LIPITOR) 20 MG tablet Take 1 tablet by mouth daily 12/8/21   INA Altamirano CNP   gemfibrozil (LOPID) 600 MG tablet Take 1 tablet by mouth 2 times daily 12/8/21   INA Altamirano CNP   glipiZIDE (GLUCOTROL XL) 10 MG extended release tablet Take 1 tablet by mouth daily 12/8/21   INA Altamirano CNP   metFORMIN (GLUCOPHAGE) 500 MG tablet Take 2 tablets by mouth 2 times daily (with meals) 12/8/21   INA Moyer CNP   metoprolol tartrate (LOPRESSOR) 50 MG tablet Take 1 tablet by mouth 2 times daily 12/8/21   INA Bhatt CNP   PARoxetine (PAXIL) 20 MG tablet Take 1 tablet by mouth every morning 12/8/21   INA Tong CNP       Future Appointments   Date Time Provider Tyler Torre   4/13/2022 12:00 PM HARLEY HORVATH [facility] [place]   9/8/2022 11:00 AM Erma Iftikhar Dukes, APRN - CNP [facility] [place] [facility] [place]     ,   Diabetes Assessment    Medic Alert ID: No  Meal Planning: Avoidance of concentrated sweets   How often do you test your blood sugar?: Daily   Do you have barriers with adherence to non-pharmacologic self-management interventions?  (Nutrition/Exercise/Self-Monitoring): Yes   Have you ever had to go to the ED for symptoms of low blood sugar?: No           and   General Assessment

## 2022-04-04 ENCOUNTER — CLINICAL DOCUMENTATION (OUTPATIENT)
Dept: SPIRITUAL SERVICES | Age: 67
End: 2022-04-04

## 2022-04-04 NOTE — ACP (ADVANCE CARE PLANNING)
Advance Care Planning   Ambulatory ACP Specialist Patient Outreach    Date:  4/4/2022  ACP Specialist:  Evens Garcia    Outreach call to patient in follow-up to ACP Specialist referral from: INA Avelar CNP    [x] PCP  [] Provider   [] Ambulatory Care Management [] Other for Reason:    [x] Advance Directive Assistance  [] Code Status Discussion  [] Complete Portable DNR Order  [] Discuss Goals of Care  [] Complete POST/MOST  [] Early ACP Decision-Making  [] Other    Date Referral Received: 3/9/2022    Today's Outreach:  [] First   [] Second  [x] Third                               Third outreach made by [x]  phone  [] email []   Wipebookt     Intervention:  [] Spoke with Patient  [x] Left VM requesting return call      Outcome: Left pt a reminder for next week's AD appointment. Next Step:   [x] ACP scheduled conversation  [] Outreach again in one week               [] Email / Mail ACP Info Sheets  [] Email / Mail Advance Directive            [] Close Referral. Routing closure to referring provider/staff and to ACP Specialist .      Thank you for this referral.

## 2022-04-07 ENCOUNTER — CARE COORDINATION (OUTPATIENT)
Dept: CARE COORDINATION | Age: 67
End: 2022-04-07

## 2022-04-07 NOTE — CARE COORDINATION
ALEJANDROM contacted the patient back once speaking to Venus did have a resource through [employer] which ACM gave to the patient on return call. Patient has a very large penitentiary that prevents her from receiving any help though JFS and COA. Due to her penitentiary fund, patient may need to discuss with an  on her next steps. ALEJANDROM also provided the patient with the phone number to [employer] to see if any resources they have would be available to the patient. Lastly ALEJANDROM sent via [employer] a two page list of services available to patients in [address] [place] [address]. ALEJANDROM explained to the patient how to access the attachment and that each resource describes what they help with and phone numbers to contact. The patient was very thankful for the return call. ALEJANDROM advised she will reach out to the patient in a few weeks to discuss her health and assess for further needs in CC. Patient in agreement to this.     Plan     Follow up two weeks  Assess DM   Assess further needs

## 2022-04-07 NOTE — CARE COORDINATION
Ambulatory Care Coordination Note  4/7/2022  CM Risk Score: 1  Charlson 10 Year Mortality Risk Score: 23%     ACC: Amarjit Portillo, RN    Summary Note: ACM outreach to patient to insure she received the resources that were sent to her. Patient said she had not. ACM advised she sent via [employer] but will also have sent via mail again. Patient stated she is struggling financially since moving from [facility]. Where she lives there is not public transportation daily, and she does not have money to use other options like uber. Patient has a large alf fund that has prevented her from receiving any public assistance. Patient does not have the funds available to get medical treatments or vaccines. AC did provide the patient with the [address] number for vaccines, but the patient is unable to get there. Patient has an upcoming appointment for a colonoscopy and she is fearful of the cost associated. AC did provide the patient with the number to [facility] billing to work out a payment plan. Patient stated she didn't have money for a payment plan. Patient stated she also can not complete her taxes due to her computer not working. Patient states that she is struggling to survive on her fix income combined with her daughters income. Her daughter is on disability and also does not receive services. The patient and the daughter walk everywhere. Patient uses her credit care to get food, but has limited funds to pay this off each month. Patient was very upset throughout the call. ACM offered to speak to our SW to see if there were other services they may know of for the patient. ACM advised she will call the patient back this afternoon to discuss what information the  may have to offer. Patient in agreement to this. ACM will also touch base on her DM this afternoon when return call made. Patient was to upset about finances at the time of the call to discuss anything else at that time. Care Coordination Interventions    Program Enrollment: Rising Risk  Referral from Primary Care Provider: Yes  Suggested Interventions and Community Resources  Diabetes Education: In Process (Comment: Zone Tools)  Medication Assistance Program: In Process (Comment: Juanita Hummel. [facility] department for vaccines)  Senior Services: Completed (Comment: COA referral )  Transportation Support: In Process  Zone Management Tools: In Process         Goals Addressed    None         Prior to Admission medications    Medication Sig Start Date End Date Taking?  Authorizing Provider   nystatin (MYCOSTATIN) 966960 UNIT/GM cream Apply topically as needed  1/20/22   Historical Provider, MD   atorvastatin (LIPITOR) 20 MG tablet Take 1 tablet by mouth daily 12/8/21   INA Green CNP   gemfibrozil (LOPID) 600 MG tablet Take 1 tablet by mouth 2 times daily 12/8/21   INA Green CNP   glipiZIDE (GLUCOTROL XL) 10 MG extended release tablet Take 1 tablet by mouth daily 12/8/21   INA Green CNP   metFORMIN (GLUCOPHAGE) 500 MG tablet Take 2 tablets by mouth 2 times daily (with meals) 12/8/21   INA Degroot CNP   metoprolol tartrate (LOPRESSOR) 50 MG tablet Take 1 tablet by mouth 2 times daily 12/8/21   INA Green CNP   PARoxetine (PAXIL) 20 MG tablet Take 1 tablet by mouth every morning 12/8/21   INA Degroot CNP       Future Appointments   Date Time Provider Tyler Torre   4/13/2022 12:00 PM HARLEY HORVATH  1 [facility] [address] [place] [address]   9/8/2022 11:00 AM INA Degroot CNP [facility] - [place]

## 2022-04-13 ENCOUNTER — CLINICAL DOCUMENTATION (OUTPATIENT)
Dept: SPIRITUAL SERVICES | Age: 67
End: 2022-04-13

## 2022-04-13 ENCOUNTER — HOSPITAL ENCOUNTER (OUTPATIENT)
Dept: WOMENS IMAGING | Age: 67
Discharge: HOME | End: 2022-04-13
Payer: MEDICARE

## 2022-04-13 DIAGNOSIS — Z78.0 POSTMENOPAUSAL: ICD-10-CM

## 2022-04-13 PROCEDURE — 77080 DXA BONE DENSITY AXIAL: CPT

## 2022-04-22 ENCOUNTER — CARE COORDINATION (OUTPATIENT)
Dept: CARE COORDINATION | Age: 67
End: 2022-04-22

## 2022-04-22 NOTE — CARE COORDINATION
ACM outreach to patient to touch base. Patient did not answer. ACM left a message for patient to return call. ACM follow up at a later date.

## 2022-04-27 ENCOUNTER — CARE COORDINATION (OUTPATIENT)
Dept: CARE COORDINATION | Age: 67
End: 2022-04-27

## 2022-04-27 NOTE — CARE COORDINATION
ACM received a [employer] message from patient stating she is unable to afford her colonoscopy or her shingles vaccine. ALEJANDROM responded with additional resources for the patient providing her with [address] and [address] [employer] [facility] assistance phone numbers. See Patient note from 4.7.22 for details   Will follow up at a later date.

## 2022-05-04 ENCOUNTER — CARE COORDINATION (OUTPATIENT)
Dept: CARE COORDINATION | Age: 67
End: 2022-05-04

## 2022-05-04 NOTE — CARE COORDINATION
Ambulatory Care Coordination Note  5/4/2022  CM Risk Score: 2  Charlson 10 Year Mortality Risk Score: 23%     ACC: Chela Otto RN    Summary Note: ACM outreach to patient to [facility]. Patient stated she did receive the resources sent by [facility] in her mychart and thanked [facility]. Patient noted that she has been walking a lot more now that the weather is nice, and noted that her legs feel stronger. Patient noted she has not been testing her BS due to not having any supplies. ACM discussed that her A1C was elevated and she will send a note to PCP to see if testing supplies can be sent to the pharmacy. Patient thanked AC. Patient noted that she watches her diet to avoid sweets and carbs when possible. [facility] will send patient some DM meal ideas via [employer] for her to have on hand. Patient thanked [facility]. ACM asked if patient has any other concerns or needs at this time and patient declined. Plan     PCP note about BS supplies  Send DM meal ideas via [employer]  Follow Up in two weeks  Assess for further needs            Care Coordination Interventions    Program Enrollment: Rising Risk  Referral from Primary Care Provider: Yes  Suggested Interventions and Community Resources  Diabetes Education: In Process (Comment: Zone Tools)  Medication Assistance Program: In Process (Comment: Carol Hummelemerson. [facility] department for vaccines)  Senior Services: Completed (Comment: COA referral )  Transportation Support: In Process  Zone Management Tools: In Process         Goals Addressed                 This Visit's Progress     COMPLETED: Community Resource Goal        I will complete referral to [facility] on Aging (Senior Services), [employer], and Medication Assistance for assistance. I will complete application for assistance to [address] [place] [address] on Aging (Senior Services), [employer], and Medication Assistance for [employer].      Barriers: fear of failure, financial, and lack of support  Plan for overcoming my barriers: ACM  Confidence: 7/10  Anticipated Goal Completion Date: 6.3.22    Patient completed application but was denied services due to income. 5.3.22 AC       Conditions and Symptoms   On track     I will schedule office visits, as directed by my provider. I will keep my appointment or reschedule if I have to cancel. I will notify my provider of any barriers to my plan of care. I will follow my Zone Management tool to seek urgent or emergent care. I will notify my provider of any symptoms that indicate a worsening of my condition. Barriers: lack of motivation and lack of support  Plan for overcoming my barriers: PCP and ACM  Confidence: 8/10  Anticipated Goal Completion Date: 6.3.22         Self Monitoring        Self-Monitored Blood Glucose - I will check my blood sugar Fasting blood sugar  I will notify my provider of any trends of increasing or decreasing blood sugars over a 1 month period. I will notify my provider if I have any blood sugar readings less than 70 more than 2 times a month. None Recently Recorded    Barriers: lack of support and overwhelmed by complexity of regimen  Plan for overcoming my barriers: ACM and PCP  Confidence: 8/10  Anticipated Goal Completion Date: 6.3.22    Patient notes she is not able to test BS due to not having testing supplies. ACM will reach out to PCP to have this sent to pharmacy. 5.3. 22AC              Prior to Admission medications    Medication Sig Start Date End Date Taking?  Authorizing Provider   nystatin (MYCOSTATIN) [id number] UNIT/GM cream Apply topically as needed  1/20/22  Yes Historical Provider, MD   atorvastatin (LIPITOR) 20 MG tablet Take 1 tablet by mouth daily 12/8/21  Yes INA Osborne CNP   gemfibrozil (LOPID) 600 MG tablet Take 1 tablet by mouth 2 times daily 12/8/21  Yes INA Osborne CNP   glipiZIDE (GLUCOTROL XL) 10 MG extended release tablet Take 1 tablet by mouth daily 12/8/21  Yes INA Christiansen CNP   metFORMIN (GLUCOPHAGE) 500 MG tablet Take 2 tablets by mouth 2 times daily (with meals) 12/8/21  Yes INA Christiansen CNP   metoprolol tartrate (LOPRESSOR) 50 MG tablet Take 1 tablet by mouth 2 times daily 12/8/21  Yes INA Christiansen CNP   PARoxetine (PAXIL) 20 MG tablet Take 1 tablet by mouth every morning 12/8/21  Yes Ellamae Holter, APRN - CNP       Future Appointments   Date Time Provider Tyler Torre   9/8/2022 11:00 AM INA Christiansen CNP [facility] [place] [facility] [place]      and   Diabetes Assessment    Medic Alert ID: No  Meal Planning: Avoidance of concentrated sweets   How often do you test your blood sugar?: No Testing (Comment: patient does not have testing supplies. )   Do you have barriers with adherence to non-pharmacologic self-management interventions?  (Nutrition/Exercise/Self-Monitoring): Yes   Have you ever had to go to the ED for symptoms of low blood sugar?: No

## 2022-05-05 NOTE — TELEPHONE ENCOUNTER
I don't recommend blood glucose monitoring at this time. Nutrition and exercise changes are adequate. She can discuss with me at next visit.

## 2022-05-19 ENCOUNTER — CARE COORDINATION (OUTPATIENT)
Dept: CARE COORDINATION | Age: 67
End: 2022-05-19

## 2022-06-10 ENCOUNTER — CARE COORDINATION (OUTPATIENT)
Dept: CARE COORDINATION | Age: 67
End: 2022-06-10

## 2022-06-10 NOTE — CARE COORDINATION
ACM called but patient did not answer. [facility] left message for patient to call back and left call back number. Will follow up at a later date. [facility] also sent [employer] message for patient to return call to [employer] for touch base.

## 2022-06-13 ENCOUNTER — CARE COORDINATION (OUTPATIENT)
Dept: CARE COORDINATION | Age: 67
End: 2022-06-13

## 2022-06-13 ENCOUNTER — TELEPHONE (OUTPATIENT)
Dept: FAMILY MEDICINE CLINIC | Age: 67
End: 2022-06-13

## 2022-06-13 DIAGNOSIS — E78.5 HYPERLIPIDEMIA, UNSPECIFIED HYPERLIPIDEMIA TYPE: ICD-10-CM

## 2022-06-13 DIAGNOSIS — E11.9 TYPE 2 DIABETES MELLITUS WITHOUT COMPLICATION, WITHOUT LONG-TERM CURRENT USE OF INSULIN (HCC): ICD-10-CM

## 2022-06-13 DIAGNOSIS — F32.A DEPRESSION, UNSPECIFIED DEPRESSION TYPE: ICD-10-CM

## 2022-06-13 DIAGNOSIS — I10 HYPERTENSION, UNSPECIFIED TYPE: ICD-10-CM

## 2022-06-13 NOTE — TELEPHONE ENCOUNTER
Requesting Refills     atorvastatin (LIPITOR) 20 MG tablet     gemfibrozil (LOPID) 600 MG tablet     glipiZIDE (GLUCOTROL XL) 10 MG extended release tablet     metFORMIN (GLUCOPHAGE) 500 MG tablet     metoprolol tartrate (LOPRESSOR) 50 MG tablet     metoprolol tartrate (LOPRESSOR) 50 MG tablet     PARoxetine (PAXIL) 20 MG tablet     Last Office Visit  -  3/8/22  Next Office Visit  -  9/8/22

## 2022-06-13 NOTE — TELEPHONE ENCOUNTER
Patient called [facility] requesting to speak to Dimple Remy in regards to her medications  I informed her that she was gone for the day  Patient upset because she is almost out of her medication and needs them refilled  She also didn't have the phone # to [place] location, I provided her the phone number    Patient requesting a call back from Dimple Remy

## 2022-06-13 NOTE — CARE COORDINATION
Patient responded to [employer] message. Patient was again provided resources for vaccines and medical procedures. Patient is struggling financially but services are not available to her due to large senior care funds she has. See previous notes for details. Patient notes she was doing fine and monitoring her sugar intake and exercising. ACM advised patient she will follow up with her later this week. Patient also asked to have all her prescriptions refilled accept gemfibrozil. ACM will route to office to have this done.       Plan    follow up later this week with patient

## 2022-06-14 ENCOUNTER — CARE COORDINATION (OUTPATIENT)
Dept: CARE COORDINATION | Age: 67
End: 2022-06-14

## 2022-06-14 RX ORDER — ATORVASTATIN CALCIUM 20 MG/1
20 TABLET, FILM COATED ORAL DAILY
Qty: 90 TABLET | Refills: 1 | Status: SHIPPED | OUTPATIENT
Start: 2022-06-14

## 2022-06-14 RX ORDER — PAROXETINE HYDROCHLORIDE 20 MG/1
20 TABLET, FILM COATED ORAL EVERY MORNING
Qty: 90 TABLET | Refills: 1 | Status: SHIPPED | OUTPATIENT
Start: 2022-06-14

## 2022-06-14 RX ORDER — GEMFIBROZIL 600 MG/1
600 TABLET, FILM COATED ORAL 2 TIMES DAILY
Qty: 180 TABLET | Refills: 1 | Status: SHIPPED | OUTPATIENT
Start: 2022-06-14

## 2022-06-14 RX ORDER — METOPROLOL TARTRATE 50 MG/1
50 TABLET, FILM COATED ORAL 2 TIMES DAILY
Qty: 180 TABLET | Refills: 1 | Status: SHIPPED | OUTPATIENT
Start: 2022-06-14

## 2022-06-14 RX ORDER — GLIPIZIDE 10 MG/1
10 TABLET, FILM COATED, EXTENDED RELEASE ORAL DAILY
Qty: 90 TABLET | Refills: 1 | Status: SHIPPED | OUTPATIENT
Start: 2022-06-14

## 2022-06-14 NOTE — CARE COORDINATION
ACM received a message from [facility]  noting that patient called there looking for ACM and wanting refills on her medications. Per note, patient was directed to her PCP office([facility]) to request refills and advised of ACM office hours. ACM had responded to patient several times yesterday and requested the PCP send refills for her yesterday am.    ACM attempted to call patient back this morning and there was no answer, ACM left message. According to notes it appears that the refill request was done yesterday afternoon. ACM will await return call from patient. Patient has responded several times via [employer]. Will set follow up call in two weeks and assess for further CC needs.

## 2022-06-14 NOTE — TELEPHONE ENCOUNTER
Thank you for taking this call yesterday. I attempted to contact the patient back this morning but there was no answer. The refill request was done by me at 8am yesterday and looks to be in the process as of yesterday afternoon.

## 2022-07-06 ENCOUNTER — CARE COORDINATION (OUTPATIENT)
Dept: CARE COORDINATION | Age: 67
End: 2022-07-06

## 2022-07-25 ENCOUNTER — CARE COORDINATION (OUTPATIENT)
Dept: CARE COORDINATION | Age: 67
End: 2022-07-25

## 2022-07-25 ASSESSMENT — SOCIAL DETERMINANTS OF HEALTH (SDOH): HOW HARD IS IT FOR YOU TO PAY FOR THE VERY BASICS LIKE FOOD, HOUSING, MEDICAL CARE, AND HEATING?: HARD

## 2022-07-25 NOTE — CARE COORDINATION
Ambulatory Care Coordination Note  7/25/2022    ACC: Shravan Mcgarry, RN    Summary Note: ACM outreach to patient for final follow up call in [address] [place] [address]. Patient noted that she is not doing \"well\". Patient has a lot of stress in her current living arrangement at this time due to financial strain. Patient noted she is no longer able to afford where she is living due to the additional cost of owning a home. Patient has applied and reached out to many agencies and is unable to obtain any assistance due to her large correction savings. Patient notes she does not have access to this money and only gets so much a month. Patient lives with her daughter who is 40 and on disability. Patient notes she will be talking to her ex  today about this situation as he was the one who helped purchase this home when they moved from [place]. Patient does not drive and walks everywhere she needs to go. [employer] does not off 24 hour busing unless she walks a mile. Patient has reahed out the to the senior bus, but she notes she again has to walk a long way to get to this. Patient has used [employer] but it does cost her money. Patient can not afford medical procedures or office visits since she does not qualify for any assistance. ACM discussed with patient if she was able to sell her home she could look at correction communities, however her daughter is not able to live independently or in a group home due to her disability per patient. During the call patient was tearful and frustrated. Patient noted several times she is frustrated that she can not seem to get ahead and things work out. Patient noted she did not know what she was going to do with the added expenses. ACM expressed she was sorry for the troubles and stress the patient was going through. ACM asked if she had resources that were sent, COA, people working cooperatively and [employer]. Patient noted she has called them all and can not get any help.     ACM offered for patient to be seen by PCP due to added stress and anxiety patient asked what the cost was for this. ACM advised patient she is not sure what the copayment would be for an office visit. Patient noted she sees a therapist weekly and she plans to talk to them this week about medications to help with anxiety and stress. Patient noted she wishes she was not here anymore, but said she would not harm her self because of her religous beliefs. Patient was frustrated though out the call and ended the call abruptly noting \"I know you can not help me\". Patient disconnected the call. ACM was unable to complete any assessments other then financial strain. AC will D/C patient form CC at this time due to disengagement to work on goals and health conditions. Patient has been provided all community resources available at this time. Note will be routed to the PCP as FYI if they would like to follow up for any additional appointments related to her anxiety. Lab Results       None            Care Coordination Interventions    Program Enrollment: Rising Risk  Referral from Primary Care Provider: Yes  Suggested Interventions and Community Resources  Diabetes Education: In Process (Comment: Zone Tools)  Medication Assistance Program: In Process (Comment: Juanita Hummel. [facility] department for vaccines)  Senior Services: Completed (Comment: COA referral )  Transportation Support: In Process  Zone Management Tools: In Process          Goals Addressed    None         Prior to Admission medications    Medication Sig Start Date End Date Taking?  Authorizing Provider   gemfibrozil (LOPID) 600 MG tablet Take 1 tablet by mouth 2 times daily 6/14/22   INA Tyson - CNP   atorvastatin (LIPITOR) 20 MG tablet Take 1 tablet by mouth daily 6/14/22   INA Tyson - CNP   glipiZIDE (GLUCOTROL XL) 10 MG extended release tablet Take 1 tablet by mouth daily 6/14/22   Cosme Florez APRN - CNP   metFORMIN (GLUCOPHAGE) 500 MG tablet Take 2 tablets by mouth 2 times daily (with meals) 6/14/22   INA Giles Asa, CNP   metoprolol tartrate (LOPRESSOR) 50 MG tablet Take 1 tablet by mouth 2 times daily 6/14/22   INA Hadley CNP   PARoxetine (PAXIL) 20 MG tablet Take 1 tablet by mouth every morning 6/14/22   INA Hadley CNP   nystatin (MYCOSTATIN) 289881 UNIT/GM cream Apply topically as needed  1/20/22   Historical Provider, MD       Future Appointments   Date Time Provider Tyler Torre   9/8/2022 11:00 AM Erma Isabelle Asa, APRN - CNP [facility] [place] [facility] [place]

## 2022-08-25 ENCOUNTER — TELEPHONE (OUTPATIENT)
Dept: FAMILY MEDICINE CLINIC | Age: 67
End: 2022-08-25

## 2022-08-25 NOTE — TELEPHONE ENCOUNTER
Received call from Pt stating that she has a large boil on her lower back and would like to have it lanced. Pt states that it is painful when she puts any pressure on it. Pt states that she has had one in the past in a similar spot but the current boil was noticed 3 weeks ago.      Please advise

## 2022-08-25 NOTE — TELEPHONE ENCOUNTER
8/25/22 at 4:17pm-- Pt requesting Update Advise about her boil. Please Advise for possible Double Book Tomorrow 8/26/22 or ED ?

## 2022-08-31 ENCOUNTER — OFFICE VISIT (OUTPATIENT)
Dept: FAMILY MEDICINE CLINIC | Age: 67
End: 2022-08-31
Payer: MEDICARE

## 2022-08-31 VITALS
SYSTOLIC BLOOD PRESSURE: 138 MMHG | DIASTOLIC BLOOD PRESSURE: 80 MMHG | HEIGHT: 63 IN | WEIGHT: 205 LBS | OXYGEN SATURATION: 98 % | BODY MASS INDEX: 36.32 KG/M2 | HEART RATE: 74 BPM

## 2022-08-31 DIAGNOSIS — L02.91 ABSCESS: Primary | ICD-10-CM

## 2022-08-31 PROCEDURE — G8399 PT W/DXA RESULTS DOCUMENT: HCPCS | Performed by: REGISTERED NURSE

## 2022-08-31 PROCEDURE — 1036F TOBACCO NON-USER: CPT | Performed by: REGISTERED NURSE

## 2022-08-31 PROCEDURE — 3017F COLORECTAL CA SCREEN DOC REV: CPT | Performed by: REGISTERED NURSE

## 2022-08-31 PROCEDURE — 1123F ACP DISCUSS/DSCN MKR DOCD: CPT | Performed by: REGISTERED NURSE

## 2022-08-31 PROCEDURE — 99213 OFFICE O/P EST LOW 20 MIN: CPT | Performed by: REGISTERED NURSE

## 2022-08-31 PROCEDURE — 10061 I&D ABSCESS COMP/MULTIPLE: CPT | Performed by: REGISTERED NURSE

## 2022-08-31 PROCEDURE — 1090F PRES/ABSN URINE INCON ASSESS: CPT | Performed by: REGISTERED NURSE

## 2022-08-31 PROCEDURE — G8427 DOCREV CUR MEDS BY ELIG CLIN: HCPCS | Performed by: REGISTERED NURSE

## 2022-08-31 PROCEDURE — G8417 CALC BMI ABV UP PARAM F/U: HCPCS | Performed by: REGISTERED NURSE

## 2022-08-31 RX ORDER — SULFAMETHOXAZOLE AND TRIMETHOPRIM 800; 160 MG/1; MG/1
1 TABLET ORAL 2 TIMES DAILY
Qty: 20 TABLET | Refills: 0 | Status: SHIPPED | OUTPATIENT
Start: 2022-08-31 | End: 2022-09-10

## 2022-08-31 ASSESSMENT — ENCOUNTER SYMPTOMS
RESPIRATORY NEGATIVE: 1
COLOR CHANGE: 1

## 2022-08-31 NOTE — PROGRESS NOTES
Patient: Polina Parra is a 75 y.o. female who presents today with the following Chief Complaint(s):  Chief Complaint   Patient presents with    Procedure     Boil in mid back          Assessment/Plan:  1. Abscess  Cutaneous abcess. She has irritation surrounding the abscess from tape that was applied at home. She tolerates bandages well- a large bandage was used with several 2x2 gauze pads. Start Bactrim. See below for procedure note. - sulfamethoxazole-trimethoprim (BACTRIM DS;SEPTRA DS) 800-160 MG per tablet; Take 1 tablet by mouth 2 times daily for 10 days  Dispense: 20 tablet; Refill: 0      Pre-operative Diagnosis: abscess    Post-operative Diagnosis: sebaceous cyst- infected    Locations:middle back    Indications: pain/infection    Anesthesia: Lidocaine 2% with epinephrine     Procedure Details   History of allergy to iodine: no    Patient informed of the risks (including bleeding and infection) and benefits of the   procedure and Written informed consent obtained. The lesion and surrounding area were given a sterile prep using betadyne and draped in the usual sterile fashion. A, 11 blade scalpel was used to create a small  opening and I&D was completed. Large amount of purulent, bloody drainage out. The patient tolerated the procedure well. EBL: 1 ml    Findings:  none    Condition:  Stable    Complications:  none. Plan:  1. Instructed to keep the area dry and covered for 3-5 days and clean thereafter. 2. Warning signs of infection were reviewed. 3. Recommended that the patient use OTC acetaminophen as needed for pain. 4. Return for any concerns. Return if symptoms worsen or fail to improve. HPI:     She is here for a painful, red boil on her back. She says she has had this before and her provider had to drain it. She has tried draining the boil but it has not resolved after about 1 week.        Current Outpatient Medications   Medication Sig Dispense Refill sulfamethoxazole-trimethoprim (BACTRIM DS;SEPTRA DS) 800-160 MG per tablet Take 1 tablet by mouth 2 times daily for 10 days 20 tablet 0    gemfibrozil (LOPID) 600 MG tablet Take 1 tablet by mouth 2 times daily 180 tablet 1    atorvastatin (LIPITOR) 20 MG tablet Take 1 tablet by mouth daily 90 tablet 1    glipiZIDE (GLUCOTROL XL) 10 MG extended release tablet Take 1 tablet by mouth daily 90 tablet 1    metFORMIN (GLUCOPHAGE) 500 MG tablet Take 2 tablets by mouth 2 times daily (with meals) 360 tablet 1    metoprolol tartrate (LOPRESSOR) 50 MG tablet Take 1 tablet by mouth 2 times daily 180 tablet 1    PARoxetine (PAXIL) 20 MG tablet Take 1 tablet by mouth every morning 90 tablet 1    nystatin (MYCOSTATIN) 275489 UNIT/GM cream Apply topically as needed        No current facility-administered medications for this visit. Review of Systems   Constitutional:  Negative for chills, diaphoresis and fever. Respiratory: Negative. Cardiovascular: Negative. Skin:  Positive for color change. \"I have a boil\"   Neurological: Negative. Vitals:    08/31/22 0932   BP: 138/80   Pulse: 74   SpO2: 98%   Weight: 205 lb (93 kg)   Height: 5' 3\" (1.6 m)     Physical Exam  Constitutional:       General: She is not in acute distress. Appearance: Normal appearance. She is not ill-appearing, toxic-appearing or diaphoretic. HENT:      Head: Normocephalic and atraumatic. Eyes:      Extraocular Movements: Extraocular movements intact. Conjunctiva/sclera: Conjunctivae normal.      Pupils: Pupils are equal, round, and reactive to light. Cardiovascular:      Rate and Rhythm: Normal rate. Heart sounds: No friction rub. Pulmonary:      Effort: Pulmonary effort is normal.   Musculoskeletal:         General: Normal range of motion. Cervical back: Normal range of motion. Right lower leg: No edema. Left lower leg: No edema. Skin:     General: Skin is warm and dry.       Coloration: Skin is not jaundiced or pale. Findings: Lesion (large 2.5 cm erythematous abscess on mid back) present. No erythema. Neurological:      General: No focal deficit present. Mental Status: She is alert and oriented to person, place, and time. Psychiatric:         Mood and Affect: Mood normal.         Behavior: Behavior normal.         Thought Content: Thought content normal.         Judgment: Judgment normal.          Patient's past medical history, surgical history, family history, medications,  and allergies  were all reviewed and updated as appropriate today. Patient Active Problem List   Diagnosis    Type 2 diabetes mellitus without complication, without long-term current use of insulin ([facility] [address]    Type 2 diabetes mellitus     Past Medical History:   Diagnosis Date    Bilateral sciatica     Cardiomegaly     COPD (chronic obstructive pulmonary disease) (HCC)     Depression     Hyperlipidemia     Overflow incontinence     Primary hypertension     Type 2 diabetes mellitus without complication, without long-term current use of insulin (HCC)     Venous insufficiency     Vitamin D deficiency       Past Surgical History:   Procedure Laterality Date    APPENDECTOMY      THYROID SURGERY      \"drained right thyroid\"       Family History   Problem Relation Age of Onset    High Blood Pressure Mother     Cancer Father         lung      No Known Allergies    This chart was generated using the [address] [place] [address] [address] [employer] system. I created this record but it may contain dictation errors due to the limitation of the software.

## 2022-08-31 NOTE — PATIENT INSTRUCTIONS
Watch for signs of infection- redness, swelling or purulent drainage. May take tylenol as needed for pain. Change dressing daily. Once drainage stops, can leave open (about 3-5 days)    Start Bactrim.

## 2022-09-16 ENCOUNTER — OFFICE VISIT (OUTPATIENT)
Dept: FAMILY MEDICINE CLINIC | Age: 67
End: 2022-09-16
Payer: MEDICARE

## 2022-09-16 VITALS
OXYGEN SATURATION: 98 % | HEART RATE: 65 BPM | BODY MASS INDEX: 35.79 KG/M2 | WEIGHT: 202 LBS | HEIGHT: 63 IN | SYSTOLIC BLOOD PRESSURE: 138 MMHG | DIASTOLIC BLOOD PRESSURE: 78 MMHG

## 2022-09-16 DIAGNOSIS — E04.9 ENLARGED THYROID: ICD-10-CM

## 2022-09-16 DIAGNOSIS — Z82.0 FAMILY HISTORY OF ALZHEIMER'S DISEASE: ICD-10-CM

## 2022-09-16 DIAGNOSIS — E78.5 HYPERLIPIDEMIA, UNSPECIFIED HYPERLIPIDEMIA TYPE: ICD-10-CM

## 2022-09-16 DIAGNOSIS — Z23 NEED FOR INFLUENZA VACCINATION: ICD-10-CM

## 2022-09-16 DIAGNOSIS — E11.9 TYPE 2 DIABETES MELLITUS WITHOUT COMPLICATION, WITHOUT LONG-TERM CURRENT USE OF INSULIN (HCC): ICD-10-CM

## 2022-09-16 DIAGNOSIS — I10 HYPERTENSION, UNSPECIFIED TYPE: ICD-10-CM

## 2022-09-16 DIAGNOSIS — E11.9 TYPE 2 DIABETES MELLITUS WITHOUT COMPLICATION, WITHOUT LONG-TERM CURRENT USE OF INSULIN (HCC): Primary | ICD-10-CM

## 2022-09-16 DIAGNOSIS — K63.5 POLYP OF COLON, UNSPECIFIED PART OF COLON, UNSPECIFIED TYPE: ICD-10-CM

## 2022-09-16 LAB
A/G RATIO: 1.8 (ref 1.1–2.2)
ALBUMIN SERPL-MCNC: 4.8 G/DL (ref 3.4–5)
ALP BLD-CCNC: 127 U/L (ref 40–129)
ALT SERPL-CCNC: 39 U/L (ref 10–40)
ANION GAP SERPL CALCULATED.3IONS-SCNC: 16 MMOL/L (ref 3–16)
AST SERPL-CCNC: 31 U/L (ref 15–37)
BILIRUB SERPL-MCNC: <0.2 MG/DL (ref 0–1)
BUN BLDV-MCNC: 19 MG/DL (ref 7–20)
CALCIUM SERPL-MCNC: 10.6 MG/DL (ref 8.3–10.6)
CHLORIDE BLD-SCNC: 101 MMOL/L (ref 99–110)
CHOLESTEROL, TOTAL: 168 MG/DL (ref 0–199)
CO2: 23 MMOL/L (ref 21–32)
CREAT SERPL-MCNC: 0.8 MG/DL (ref 0.6–1.2)
CREATININE URINE: 91.3 MG/DL (ref 28–259)
GFR AFRICAN AMERICAN: >60
GFR NON-AFRICAN AMERICAN: >60
GLUCOSE BLD-MCNC: 186 MG/DL (ref 70–99)
HBA1C MFR BLD: 7.6 %
HDLC SERPL-MCNC: 51 MG/DL (ref 40–60)
LDL CHOLESTEROL CALCULATED: 91 MG/DL
MICROALBUMIN UR-MCNC: 2.7 MG/DL
MICROALBUMIN/CREAT UR-RTO: 29.6 MG/G (ref 0–30)
POTASSIUM SERPL-SCNC: 5.4 MMOL/L (ref 3.5–5.1)
SODIUM BLD-SCNC: 140 MMOL/L (ref 136–145)
TOTAL PROTEIN: 7.5 G/DL (ref 6.4–8.2)
TRIGL SERPL-MCNC: 132 MG/DL (ref 0–150)
TSH REFLEX FT4: 0.5 UIU/ML (ref 0.27–4.2)
VLDLC SERPL CALC-MCNC: 26 MG/DL

## 2022-09-16 PROCEDURE — G8417 CALC BMI ABV UP PARAM F/U: HCPCS | Performed by: REGISTERED NURSE

## 2022-09-16 PROCEDURE — 2022F DILAT RTA XM EVC RTNOPTHY: CPT | Performed by: REGISTERED NURSE

## 2022-09-16 PROCEDURE — G0008 ADMIN INFLUENZA VIRUS VAC: HCPCS | Performed by: REGISTERED NURSE

## 2022-09-16 PROCEDURE — 83036 HEMOGLOBIN GLYCOSYLATED A1C: CPT | Performed by: REGISTERED NURSE

## 2022-09-16 PROCEDURE — 1036F TOBACCO NON-USER: CPT | Performed by: REGISTERED NURSE

## 2022-09-16 PROCEDURE — G8399 PT W/DXA RESULTS DOCUMENT: HCPCS | Performed by: REGISTERED NURSE

## 2022-09-16 PROCEDURE — 90694 VACC AIIV4 NO PRSRV 0.5ML IM: CPT | Performed by: REGISTERED NURSE

## 2022-09-16 PROCEDURE — 1123F ACP DISCUSS/DSCN MKR DOCD: CPT | Performed by: REGISTERED NURSE

## 2022-09-16 PROCEDURE — 99214 OFFICE O/P EST MOD 30 MIN: CPT | Performed by: REGISTERED NURSE

## 2022-09-16 PROCEDURE — G8427 DOCREV CUR MEDS BY ELIG CLIN: HCPCS | Performed by: REGISTERED NURSE

## 2022-09-16 PROCEDURE — 3017F COLORECTAL CA SCREEN DOC REV: CPT | Performed by: REGISTERED NURSE

## 2022-09-16 PROCEDURE — 1090F PRES/ABSN URINE INCON ASSESS: CPT | Performed by: REGISTERED NURSE

## 2022-09-16 PROCEDURE — 3051F HG A1C>EQUAL 7.0%<8.0%: CPT | Performed by: REGISTERED NURSE

## 2022-09-16 ASSESSMENT — ENCOUNTER SYMPTOMS
SHORTNESS OF BREATH: 0
GASTROINTESTINAL NEGATIVE: 1

## 2022-09-16 NOTE — PROGRESS NOTES
Patient: Paige Alvarado is a 75 y.o. female who presents today with the following Chief Complaint(s):  Chief Complaint   Patient presents with    Diabetes    Hypertension       Assessment/Plan:    All orders as below. 1. Type 2 diabetes mellitus without complication, without long-term current use of insulin ([facility] [address]  Recommend checking blood sugar for improved diet compliance. Encouraged adherence to diabetic diet and exercise. - POCT glycosylated hemoglobin (Hb A1C)  - blood glucose monitor kit and supplies; Dispense sufficient amount for indicated testing frequency plus additional to accommodate PRN testing needs. Dispense all needed supplies to include: monitor, strips, lancing device, lancets, control solutions, alcohol swabs. Dispense: 1 kit; Refill: 0  - Microalbumin / Creatinine Urine Ratio; Future  - Diabetic Foot Exam- normal sensation    2. Hyperlipidemia, unspecified hyperlipidemia type    - Lipid Panel; Future    3. Hypertension, unspecified type  Stable. Continue current medication.   - Comprehensive Metabolic Panel; Future    4. Need for influenza vaccination    - Influenza, FLUAD, (age 72 y+), IM, PF, 0.5 mL    5. Enlarged thyroid  History of enlarged thyroid. Has not seen an ENT in about 1.5 years. Thyroid is enlarged on right. Will check her TSH today.   - TSH with Reflex to FT4; Future    6. Family history of Alzheimer's disease  Multiple family members with alzheimer's disease. She feels her short term memory is worsening and would like further evaluation.   - [facility] - Josselyn Ellison MD, Neurology, [facility]    7. Polyp of colon, unspecified part of colon, unspecified type  Follow up with GI as planned. Return in about 6 months (around 3/16/2023), or if symptoms worsen or fail to improve. HPI:     She is here for follow up for her diabetes and hypertension. Diabetes  She presents for her follow-up diabetic visit. She has type 2 diabetes mellitus.  Her disease course has been improving. There are no hypoglycemic associated symptoms. Pertinent negatives for hypoglycemia include no dizziness or headaches. Pertinent negatives for diabetes include no chest pain, no fatigue and no weakness. There are no hypoglycemic complications. Pertinent negatives for diabetic complications include no peripheral neuropathy. Risk factors for coronary artery disease include diabetes mellitus, obesity and hypertension. Current diabetic treatment includes oral agent (dual therapy). She is compliant with treatment most of the time. Her weight is decreasing steadily. Diabetic current diet: working on improving diet. Meal planning includes avoidance of concentrated sweets. Exercise: almost every day. Home blood sugar record trend: Has not been checking. An ACE inhibitor/angiotensin II receptor blocker is not being taken. She does not see a podiatrist.Eye exam is current. Hypertension  This is a chronic problem. The current episode started more than 1 year ago. The problem is unchanged. The problem is controlled. Pertinent negatives include no chest pain, headaches, malaise/fatigue, palpitations or shortness of breath. There are no associated agents to hypertension. Risk factors for coronary artery disease include diabetes mellitus, obesity and sedentary lifestyle. Past treatments include beta blockers. The current treatment provides moderate improvement. Compliance problems include diet and exercise. Current Outpatient Medications   Medication Sig Dispense Refill    blood glucose monitor kit and supplies Dispense sufficient amount for indicated testing frequency plus additional to accommodate PRN testing needs. Dispense all needed supplies to include: monitor, strips, lancing device, lancets, control solutions, alcohol swabs.  1 kit 0    gemfibrozil (LOPID) 600 MG tablet Take 1 tablet by mouth 2 times daily 180 tablet 1    atorvastatin (LIPITOR) 20 MG tablet Take 1 tablet by mouth daily 90 tablet 1 glipiZIDE (GLUCOTROL XL) 10 MG extended release tablet Take 1 tablet by mouth daily 90 tablet 1    metFORMIN (GLUCOPHAGE) 500 MG tablet Take 2 tablets by mouth 2 times daily (with meals) 360 tablet 1    metoprolol tartrate (LOPRESSOR) 50 MG tablet Take 1 tablet by mouth 2 times daily 180 tablet 1    PARoxetine (PAXIL) 20 MG tablet Take 1 tablet by mouth every morning 90 tablet 1    nystatin (MYCOSTATIN) 921601 UNIT/GM cream Apply topically as needed        No current facility-administered medications for this visit. Review of Systems   Constitutional:  Negative for fatigue, fever and malaise/fatigue. Eyes:  Negative for visual disturbance. Respiratory:  Negative for shortness of breath. Cardiovascular:  Negative for chest pain and palpitations. Gastrointestinal: Negative. Genitourinary: Negative. Musculoskeletal: Negative. Neurological:  Negative for dizziness, weakness, light-headedness and headaches. Psychiatric/Behavioral: Negative. Vitals:    09/16/22 0931   BP: 138/78   Pulse: 65   SpO2: 98%   Weight: 202 lb (91.6 kg)   Height: 5' 3\" (1.6 m)     Physical Exam  Constitutional:       General: She is not in acute distress. Appearance: Normal appearance. She is not ill-appearing, toxic-appearing or diaphoretic. HENT:      Head: Normocephalic and atraumatic. Eyes:      Extraocular Movements: Extraocular movements intact. Conjunctiva/sclera: Conjunctivae normal.      Pupils: Pupils are equal, round, and reactive to light. Cardiovascular:      Rate and Rhythm: Normal rate and regular rhythm. Pulses: Normal pulses. Dorsalis pedis pulses are 2+ on the right side and 2+ on the left side. Posterior tibial pulses are 2+ on the right side and 2+ on the left side. Heart sounds: Normal heart sounds. No murmur heard. No friction rub. No gallop. Pulmonary:      Effort: Pulmonary effort is normal. No respiratory distress. Breath sounds:  No stridor. No wheezing, rhonchi or rales. Musculoskeletal:         General: Normal range of motion. Cervical back: Normal range of motion. Right lower leg: No edema. Left lower leg: No edema. Right foot: Normal range of motion. No deformity. Left foot: Normal range of motion. No deformity. Feet:      Right foot:      Protective Sensation: 8 sites tested. 8 sites sensed. Skin integrity: Skin integrity normal. No ulcer, skin breakdown or erythema. Left foot:      Protective Sensation: 8 sites tested. 8 sites sensed. Skin integrity: Skin integrity normal. No ulcer, skin breakdown or erythema. Skin:     General: Skin is warm and dry. Coloration: Skin is not jaundiced or pale. Findings: No erythema. Neurological:      General: No focal deficit present. Mental Status: She is alert and oriented to person, place, and time. Psychiatric:         Mood and Affect: Mood normal.         Behavior: Behavior normal.         Thought Content: Thought content normal.         Judgment: Judgment normal.       Patient's past medical history, surgical history, family history, medications,  and allergies  were all reviewed and updated as appropriate today.     Patient Active Problem List   Diagnosis    Type 2 diabetes mellitus without complication, without long-term current use of insulin ([facility] [address]    Type 2 diabetes mellitus     Past Medical History:   Diagnosis Date    Bilateral sciatica     Cardiomegaly     COPD (chronic obstructive pulmonary disease) ([facility])     Depression     Hyperlipidemia     Overflow incontinence     Primary hypertension     Type 2 diabetes mellitus without complication, without long-term current use of insulin ([facility])     Venous insufficiency     Vitamin D deficiency       Past Surgical History:   Procedure Laterality Date    APPENDECTOMY      THYROID SURGERY      \"drained right thyroid\"       Family History   Problem Relation Age of Onset    High Blood

## 2022-09-16 NOTE — PATIENT INSTRUCTIONS
You are due for your tetanus vaccination. You are due for your 2nd shingles vaccination in December. Schedule colonoscopy. Start checking blood sugar 3 times a week first thing in the morning. Return in 6 months.

## 2022-09-20 DIAGNOSIS — E87.5 SERUM POTASSIUM ELEVATED: Primary | ICD-10-CM

## 2022-09-28 PROBLEM — Z00.00 ENCOUNTER FOR PREVENTIVE HEALTH EXAMINATION: Status: ACTIVE | Noted: 2022-09-28

## 2022-09-30 ENCOUNTER — TELEPHONE (OUTPATIENT)
Dept: FAMILY MEDICINE CLINIC | Age: 67
End: 2022-09-30

## 2022-09-30 DIAGNOSIS — E11.9 TYPE 2 DIABETES MELLITUS WITHOUT COMPLICATION, WITHOUT LONG-TERM CURRENT USE OF INSULIN (HCC): ICD-10-CM

## 2022-09-30 NOTE — TELEPHONE ENCOUNTER
Last Office Visit  -  9-  Next Office Visit  -  3-    Last Filled  -    Last UDS -    Contract -      Refill true metrix blood glucose mtr    True plus lancets    Bd single use swab

## 2022-09-30 NOTE — TELEPHONE ENCOUNTER
Randi Diamond Well spoke with [facility] about the glucose meter. It will be mailed  out to the patient 7- 10 days.

## 2022-10-03 NOTE — TELEPHONE ENCOUNTER
Received second request from [facility] for  True Metrix test strips and TruePlus 33 G lancets    BD Single use swab

## 2022-10-06 DIAGNOSIS — E87.5 SERUM POTASSIUM ELEVATED: ICD-10-CM

## 2022-10-06 LAB — POTASSIUM SERPL-SCNC: 5 MMOL/L (ref 3.5–5.1)

## 2022-11-08 ENCOUNTER — OFFICE VISIT (OUTPATIENT)
Dept: FAMILY MEDICINE CLINIC | Age: 67
End: 2022-11-08
Payer: MEDICARE

## 2022-11-08 VITALS
BODY MASS INDEX: 34.66 KG/M2 | RESPIRATION RATE: 16 BRPM | HEIGHT: 64 IN | HEART RATE: 68 BPM | OXYGEN SATURATION: 97 % | WEIGHT: 203 LBS | SYSTOLIC BLOOD PRESSURE: 136 MMHG | DIASTOLIC BLOOD PRESSURE: 74 MMHG

## 2022-11-08 DIAGNOSIS — E11.9 TYPE 2 DIABETES MELLITUS WITHOUT COMPLICATION, WITHOUT LONG-TERM CURRENT USE OF INSULIN (HCC): Primary | ICD-10-CM

## 2022-11-08 PROCEDURE — 1036F TOBACCO NON-USER: CPT | Performed by: REGISTERED NURSE

## 2022-11-08 PROCEDURE — G8484 FLU IMMUNIZE NO ADMIN: HCPCS | Performed by: REGISTERED NURSE

## 2022-11-08 PROCEDURE — G8399 PT W/DXA RESULTS DOCUMENT: HCPCS | Performed by: REGISTERED NURSE

## 2022-11-08 PROCEDURE — 1090F PRES/ABSN URINE INCON ASSESS: CPT | Performed by: REGISTERED NURSE

## 2022-11-08 PROCEDURE — G8417 CALC BMI ABV UP PARAM F/U: HCPCS | Performed by: REGISTERED NURSE

## 2022-11-08 PROCEDURE — 3051F HG A1C>EQUAL 7.0%<8.0%: CPT | Performed by: REGISTERED NURSE

## 2022-11-08 PROCEDURE — 3017F COLORECTAL CA SCREEN DOC REV: CPT | Performed by: REGISTERED NURSE

## 2022-11-08 PROCEDURE — 2022F DILAT RTA XM EVC RTNOPTHY: CPT | Performed by: REGISTERED NURSE

## 2022-11-08 PROCEDURE — 99213 OFFICE O/P EST LOW 20 MIN: CPT | Performed by: REGISTERED NURSE

## 2022-11-08 PROCEDURE — 1123F ACP DISCUSS/DSCN MKR DOCD: CPT | Performed by: REGISTERED NURSE

## 2022-11-08 PROCEDURE — G8427 DOCREV CUR MEDS BY ELIG CLIN: HCPCS | Performed by: REGISTERED NURSE

## 2022-11-08 ASSESSMENT — ENCOUNTER SYMPTOMS
RESPIRATORY NEGATIVE: 1
GASTROINTESTINAL NEGATIVE: 1

## 2022-11-08 NOTE — PROGRESS NOTES
Patient: Saintclair Cress is a 75 y.o. female who presents today with the following Chief Complaint(s):  Chief Complaint   Patient presents with    Diabetes     Needs instruction on how to use BS device       Assessment/Plan:  1. Type 2 diabetes mellitus without complication, without long-term current use of insulin ([facility] [address]  Education and instruction on use of glucometer and proper loading and use of lancets and lancet device. Return for any further issues. Return in about 4 months (around 3/8/2023), or if symptoms worsen or fail to improve, for Diabetes follow up. HPI:     She is here for follow up for diabetes. She has been unable to check her blood sugars with new blood glucose mnitor and would like instruction on how to use her new meter and lancets. Current Outpatient Medications   Medication Sig Dispense Refill    blood glucose monitor kit and supplies Dispense sufficient amount for indicated testing frequency plus additional to accommodate PRN testing needs. Dispense all needed supplies to include: monitor, strips, lancing device, lancets, control solutions, alcohol swabs. 1 kit 0    gemfibrozil (LOPID) 600 MG tablet Take 1 tablet by mouth 2 times daily 180 tablet 1    atorvastatin (LIPITOR) 20 MG tablet Take 1 tablet by mouth daily 90 tablet 1    glipiZIDE (GLUCOTROL XL) 10 MG extended release tablet Take 1 tablet by mouth daily 90 tablet 1    metFORMIN (GLUCOPHAGE) 500 MG tablet Take 2 tablets by mouth 2 times daily (with meals) 360 tablet 1    metoprolol tartrate (LOPRESSOR) 50 MG tablet Take 1 tablet by mouth 2 times daily 180 tablet 1    PARoxetine (PAXIL) 20 MG tablet Take 1 tablet by mouth every morning 90 tablet 1    nystatin (MYCOSTATIN) 483699 UNIT/GM cream Apply topically as needed        No current facility-administered medications for this visit. Review of Systems   Constitutional: Negative. Respiratory: Negative. Cardiovascular: Negative. Gastrointestinal: Negative. Endocrine: Negative. Genitourinary: Negative. Musculoskeletal: Negative. Neurological: Negative. Psychiatric/Behavioral: Negative. Vitals:    11/08/22 1251   BP: 136/74   Site: Left Upper Arm   Position: Sitting   Cuff Size: Large Adult   Pulse: 68   Resp: 16   SpO2: 97%   Weight: 203 lb (92.1 kg)   Height: 5' 3.5\" (1.613 m)     Physical Exam  Constitutional:       General: She is not in acute distress. Appearance: Normal appearance. She is not ill-appearing, toxic-appearing or diaphoretic. HENT:      Head: Normocephalic and atraumatic. Eyes:      Extraocular Movements: Extraocular movements intact. Conjunctiva/sclera: Conjunctivae normal.      Pupils: Pupils are equal, round, and reactive to light. Cardiovascular:      Rate and Rhythm: Normal rate. Pulmonary:      Effort: Pulmonary effort is normal.   Musculoskeletal:         General: Normal range of motion. Cervical back: Normal range of motion. Right lower leg: No edema. Left lower leg: No edema. Skin:     General: Skin is warm and dry. Coloration: Skin is not jaundiced or pale. Findings: No erythema. Neurological:      General: No focal deficit present. Mental Status: She is alert and oriented to person, place, and time. Psychiatric:         Mood and Affect: Mood normal.         Behavior: Behavior normal.         Thought Content: Thought content normal.         Judgment: Judgment normal.          Patient's past medical history, surgical history, family history, medications,  and allergies  were all reviewed and updated as appropriate today.     Patient Active Problem List   Diagnosis    Type 2 diabetes mellitus without complication, without long-term current use of insulin ([facility] [address]    Type 2 diabetes mellitus     Past Medical History:   Diagnosis Date    Bilateral sciatica     Cardiomegaly     COPD (chronic obstructive pulmonary disease) (HCC)     Depression     Hyperlipidemia     Overflow incontinence     Primary hypertension     Type 2 diabetes mellitus without complication, without long-term current use of insulin (HCC)     Venous insufficiency     Vitamin D deficiency       Past Surgical History:   Procedure Laterality Date    APPENDECTOMY      THYROID SURGERY      \"drained right thyroid\"       Family History   Problem Relation Age of Onset    High Blood Pressure Mother     Cancer Father         lung      No Known Allergies    This chart was generated using the [address] [place] [address] [address] dictation system. I created this record but it may contain dictation errors due to the limitation of the software.

## 2022-11-22 ENCOUNTER — OFFICE VISIT (OUTPATIENT)
Dept: FAMILY MEDICINE CLINIC | Age: 67
End: 2022-11-22
Payer: MEDICARE

## 2022-11-22 VITALS
RESPIRATION RATE: 16 BRPM | BODY MASS INDEX: 34.66 KG/M2 | SYSTOLIC BLOOD PRESSURE: 122 MMHG | WEIGHT: 203 LBS | HEART RATE: 76 BPM | OXYGEN SATURATION: 96 % | HEIGHT: 64 IN | DIASTOLIC BLOOD PRESSURE: 84 MMHG

## 2022-11-22 DIAGNOSIS — E78.5 HYPERLIPIDEMIA, UNSPECIFIED HYPERLIPIDEMIA TYPE: ICD-10-CM

## 2022-11-22 DIAGNOSIS — I10 HYPERTENSION, UNSPECIFIED TYPE: ICD-10-CM

## 2022-11-22 DIAGNOSIS — Z12.11 SCREENING FOR COLON CANCER: ICD-10-CM

## 2022-11-22 DIAGNOSIS — E11.65 TYPE 2 DIABETES MELLITUS WITH HYPERGLYCEMIA, WITHOUT LONG-TERM CURRENT USE OF INSULIN (HCC): Primary | ICD-10-CM

## 2022-11-22 DIAGNOSIS — F32.A DEPRESSION, UNSPECIFIED DEPRESSION TYPE: ICD-10-CM

## 2022-11-22 PROBLEM — F33.0 MAJOR DEPRESSIVE DISORDER, RECURRENT, MILD (HCC): Status: ACTIVE | Noted: 2022-11-22

## 2022-11-22 PROCEDURE — G8417 CALC BMI ABV UP PARAM F/U: HCPCS | Performed by: REGISTERED NURSE

## 2022-11-22 PROCEDURE — G8484 FLU IMMUNIZE NO ADMIN: HCPCS | Performed by: REGISTERED NURSE

## 2022-11-22 PROCEDURE — 3074F SYST BP LT 130 MM HG: CPT | Performed by: REGISTERED NURSE

## 2022-11-22 PROCEDURE — 3078F DIAST BP <80 MM HG: CPT | Performed by: REGISTERED NURSE

## 2022-11-22 PROCEDURE — 1090F PRES/ABSN URINE INCON ASSESS: CPT | Performed by: REGISTERED NURSE

## 2022-11-22 PROCEDURE — 1123F ACP DISCUSS/DSCN MKR DOCD: CPT | Performed by: REGISTERED NURSE

## 2022-11-22 PROCEDURE — G8427 DOCREV CUR MEDS BY ELIG CLIN: HCPCS | Performed by: REGISTERED NURSE

## 2022-11-22 PROCEDURE — 1036F TOBACCO NON-USER: CPT | Performed by: REGISTERED NURSE

## 2022-11-22 PROCEDURE — 3017F COLORECTAL CA SCREEN DOC REV: CPT | Performed by: REGISTERED NURSE

## 2022-11-22 PROCEDURE — 3051F HG A1C>EQUAL 7.0%<8.0%: CPT | Performed by: REGISTERED NURSE

## 2022-11-22 PROCEDURE — 2022F DILAT RTA XM EVC RTNOPTHY: CPT | Performed by: REGISTERED NURSE

## 2022-11-22 PROCEDURE — 99214 OFFICE O/P EST MOD 30 MIN: CPT | Performed by: REGISTERED NURSE

## 2022-11-22 PROCEDURE — G8399 PT W/DXA RESULTS DOCUMENT: HCPCS | Performed by: REGISTERED NURSE

## 2022-11-22 RX ORDER — LANCETS 30 GAUGE
1 EACH MISCELLANEOUS 2 TIMES DAILY
Qty: 300 EACH | Refills: 1 | Status: SHIPPED | OUTPATIENT
Start: 2022-11-22

## 2022-11-22 RX ORDER — PAROXETINE HYDROCHLORIDE 20 MG/1
20 TABLET, FILM COATED ORAL EVERY MORNING
Qty: 90 TABLET | Refills: 1 | Status: SHIPPED | OUTPATIENT
Start: 2022-11-22

## 2022-11-22 RX ORDER — ATORVASTATIN CALCIUM 20 MG/1
20 TABLET, FILM COATED ORAL DAILY
Qty: 90 TABLET | Refills: 1 | Status: SHIPPED | OUTPATIENT
Start: 2022-11-22

## 2022-11-22 RX ORDER — BLOOD-GLUCOSE,RECEIVER,CONT
EACH MISCELLANEOUS
Qty: 1 EACH | Refills: 0 | Status: SHIPPED | OUTPATIENT
Start: 2022-11-22

## 2022-11-22 RX ORDER — GLIPIZIDE 10 MG/1
10 TABLET, FILM COATED, EXTENDED RELEASE ORAL DAILY
Qty: 90 TABLET | Refills: 1 | Status: SHIPPED | OUTPATIENT
Start: 2022-11-22

## 2022-11-22 RX ORDER — METOPROLOL TARTRATE 50 MG/1
50 TABLET, FILM COATED ORAL 2 TIMES DAILY
Qty: 180 TABLET | Refills: 1 | Status: SHIPPED | OUTPATIENT
Start: 2022-11-22

## 2022-11-22 RX ORDER — GEMFIBROZIL 600 MG/1
600 TABLET, FILM COATED ORAL 2 TIMES DAILY
Qty: 180 TABLET | Refills: 1 | Status: SHIPPED | OUTPATIENT
Start: 2022-11-22

## 2022-11-22 RX ORDER — CALCIUM CITRATE/VITAMIN D3 200MG-6.25
1 TABLET ORAL DAILY
Qty: 100 EACH | Refills: 3 | Status: SHIPPED | OUTPATIENT
Start: 2022-11-22

## 2022-11-22 RX ORDER — BLOOD-GLUCOSE SENSOR
EACH MISCELLANEOUS
Qty: 3 EACH | Refills: 3 | Status: SHIPPED | OUTPATIENT
Start: 2022-11-22

## 2022-11-22 RX ORDER — BLOOD-GLUCOSE TRANSMITTER
EACH MISCELLANEOUS
Qty: 1 EACH | Refills: 3 | Status: SHIPPED | OUTPATIENT
Start: 2022-11-22

## 2022-11-22 ASSESSMENT — ENCOUNTER SYMPTOMS
GASTROINTESTINAL NEGATIVE: 1
RESPIRATORY NEGATIVE: 1

## 2022-11-22 NOTE — PROGRESS NOTES
Patient: Alvin Garcia is a 75 y.o. female who presents today with the following Chief Complaint(s):  Chief Complaint   Patient presents with    Diabetes     Having issue with test strips       Assessment/Plan:  1. Type 2 diabetes mellitus with hyperglycemia, without long-term current use of insulin ([facility])  Reviewed use of her True Metrix glucometer. Brianna Bitter her through the steps several times until she felt comfortable with the process. Will also send in a prescription for Dexcom meter to see if this is covered by her insurance. Additional lancets and blood glucose test strips were sent in the event the Dexcom is not covered. - Continuous Blood Gluc Transmit (DEXCOM G6 TRANSMITTER) MISC; Monitor blood sugar daily  Dispense: 1 each; Refill: 3  - Continuous Blood Gluc Sensor (DEXCOM G6 SENSOR) MISC; Monitor blood sugar daily  Dispense: 3 each; Refill: 3  - Continuous Blood Gluc  ([address]) FRANCIS; Monitor blood sugar daily  Dispense: 1 each; Refill: 0  - glipiZIDE (GLUCOTROL XL) 10 MG extended release tablet; Take 1 tablet by mouth daily  Dispense: 90 tablet; Refill: 1  - metFORMIN (GLUCOPHAGE) 500 MG tablet; Take 2 tablets by mouth 2 times daily (with meals)  Dispense: 360 tablet; Refill: 1  - blood glucose test strips (TRUE METRIX BLOOD GLUCOSE TEST) strip; 1 each by In Vitro route daily As needed. Dispense: 100 each; Refill: 3  - Lancets MISC; 1 each by Does not apply route 2 times daily  Dispense: 300 each; Refill: 1    2. Hyperlipidemia, unspecified hyperlipidemia type  Stable. Refill sent to pharmacy. Stable. Refill sent to pharmacy. - gemfibrozil (LOPID) 600 MG tablet; Take 1 tablet by mouth 2 times daily  Dispense: 180 tablet; Refill: 1  - atorvastatin (LIPITOR) 20 MG tablet; Take 1 tablet by mouth daily  Dispense: 90 tablet; Refill: 1    3. Hypertension, unspecified type  Stable. Continue metoprolol.  - metoprolol tartrate (LOPRESSOR) 50 MG tablet;  Take 1 tablet by mouth 2 times daily Dispense: 180 tablet; Refill: 1    4. Depression, unspecified depression type  Stable. She feels Paxil is working well for her. Refill sent to pharmacy. - PARoxetine (PAXIL) 20 MG tablet; Take 1 tablet by mouth every morning  Dispense: 90 tablet; Refill: 1    5. Screening for colon cancer  Call to schedule colonoscopy. - Paola Acuna MD, Gastroenterology, [facility]      Return in about 4 weeks (around 12/20/2022), or if symptoms worsen or fail to improve, for Diabetes follow up. HPI:     She is here for continued difficulties with checking her blood sugar. She is having difficulties with using her machine. She says that her insurance will not cover a freestyle maggie. She would also like a referral for a colonoscopy. She is requesting refills. No other issues or concerns at this time. Current Outpatient Medications   Medication Sig Dispense Refill    Continuous Blood Gluc Transmit (DEXCOM G6 TRANSMITTER) MISC Monitor blood sugar daily 1 each 3    Continuous Blood Gluc Sensor (DEXCOM G6 SENSOR) MISC Monitor blood sugar daily 3 each 3    Continuous Blood Gluc  (DEXCOM G6 ) FRANCIS Monitor blood sugar daily 1 each 0    gemfibrozil (LOPID) 600 MG tablet Take 1 tablet by mouth 2 times daily 180 tablet 1    atorvastatin (LIPITOR) 20 MG tablet Take 1 tablet by mouth daily 90 tablet 1    glipiZIDE (GLUCOTROL XL) 10 MG extended release tablet Take 1 tablet by mouth daily 90 tablet 1    metFORMIN (GLUCOPHAGE) 500 MG tablet Take 2 tablets by mouth 2 times daily (with meals) 360 tablet 1    metoprolol tartrate (LOPRESSOR) 50 MG tablet Take 1 tablet by mouth 2 times daily 180 tablet 1    PARoxetine (PAXIL) 20 MG tablet Take 1 tablet by mouth every morning 90 tablet 1    blood glucose test strips (TRUE METRIX BLOOD GLUCOSE TEST) strip 1 each by In Vitro route daily As needed.  100 each 3    Lancets MISC 1 each by Does not apply route 2 times daily 300 each 1    blood glucose monitor kit and supplies Dispense sufficient amount for indicated testing frequency plus additional to accommodate PRN testing needs. Dispense all needed supplies to include: monitor, strips, lancing device, lancets, control solutions, alcohol swabs. 1 kit 0    nystatin (MYCOSTATIN) 578674 UNIT/GM cream Apply topically as needed        No current facility-administered medications for this visit. Review of Systems   Constitutional: Negative. Respiratory: Negative. Cardiovascular: Negative. Gastrointestinal: Negative. Genitourinary: Negative. Musculoskeletal: Negative. Neurological: Negative. Psychiatric/Behavioral: Negative. Vitals:    11/22/22 1618 11/22/22 1656   BP: (!) 144/84 122/84   Site: Left Upper Arm    Position: Sitting    Cuff Size: Large Adult    Pulse: 76    Resp: 16    SpO2: 96%    Weight: 203 lb (92.1 kg)    Height: 5' 3.5\" (1.613 m)      Physical Exam  Constitutional:       General: She is not in acute distress. Appearance: Normal appearance. She is not ill-appearing, toxic-appearing or diaphoretic. HENT:      Head: Normocephalic and atraumatic. Nose: Nose normal.      Mouth/Throat:      Mouth: Mucous membranes are moist.   Eyes:      General:         Right eye: No discharge. Left eye: No discharge. Extraocular Movements: Extraocular movements intact. Conjunctiva/sclera: Conjunctivae normal.      Pupils: Pupils are equal, round, and reactive to light. Cardiovascular:      Rate and Rhythm: Normal rate. Pulmonary:      Effort: Pulmonary effort is normal.   Musculoskeletal:         General: Normal range of motion. Cervical back: Normal range of motion. Right lower leg: No edema. Left lower leg: No edema. Skin:     General: Skin is warm and dry. Coloration: Skin is not jaundiced or pale. Findings: No bruising, erythema, lesion or rash. Neurological:      General: No focal deficit present.       Mental Status: She is alert and oriented to person, place, and time. Psychiatric:         Mood and Affect: Mood normal.         Behavior: Behavior normal.         Thought Content: Thought content normal.         Judgment: Judgment normal.          Patient's past medical history, surgical history, family history, medications,  and allergies  were all reviewed and updated as appropriate today. Patient Active Problem List   Diagnosis    Type 2 diabetes mellitus without complication, without long-term current use of insulin ([facility])    Type 2 diabetes mellitus    Major depressive disorder, recurrent, mild     Past Medical History:   Diagnosis Date    Bilateral sciatica     Cardiomegaly     COPD (chronic obstructive pulmonary disease) ([facility])     Depression     Hyperlipidemia     Overflow incontinence     Primary hypertension     Type 2 diabetes mellitus without complication, without long-term current use of insulin ([facility])     Venous insufficiency     Vitamin D deficiency       Past Surgical History:   Procedure Laterality Date    APPENDECTOMY      THYROID SURGERY      \"drained right thyroid\"       Family History   Problem Relation Age of Onset    High Blood Pressure Mother     Cancer Father         lung      No Known Allergies    This chart was generated using the [address] [place] [address] [address]  dictation system. I created this record but it may contain dictation errors due to the limitation of the software.

## 2022-11-28 ENCOUNTER — TELEPHONE (OUTPATIENT)
Dept: FAMILY MEDICINE CLINIC | Age: 67
End: 2022-11-28

## 2022-11-28 NOTE — TELEPHONE ENCOUNTER
Pt is requesting Advise/Help with [facility]. -Pt states [facility] is saying Pt has to have someone wait in office until Pt is ready to be discharged after having colonoscopy procedure.  -Pt says her only ride is [employer] that's provided by her insurance. This company is transportation only, not caregiver service.   -Pt does says she can have her daughter wait for her in office But she does not drive either. [facility] said the  has to wait in office.  -Pt asking for help from  [facility] or Jennifer Garcia.         MD Margarette   [address] [place] [address] [address] [place] [address]   Phone: [phone]   Fax: [phone]

## 2022-12-01 ENCOUNTER — CARE COORDINATION (OUTPATIENT)
Dept: CARE COORDINATION | Age: 67
End: 2022-12-01

## 2022-12-01 NOTE — CARE COORDINATION
Ambulatory Care Coordination Note  12/1/2022    ACC: Esperanza Gutierrez, RN    Reports her and her daughter don't drive so is trying to get things straight before she has a colonoscopy. Hasn't made an initial visit with GI but plans to do that and discuss ride issues with GI at [facility]. Discussed sometimes they will keep pt overnight in certain situations so she will discuss with GI at [facility] which plans to make later. Does get rides through [facility] for appts. Also, having problems checking fs as she's not quick enough to get the strip in meter. Reviewed last a1c 7.6 so plans to just go by bloodwork for now. Reviewed diabetic, low fat, low sodium diets. Offered dietician but declined at this time. Discussed option of [place] which is through [facility] [employer] and plans to call them to check it out as she would be able to keep her PCP with this as well. PLAN  1) fu appt/[facility]  2) review labs  3) review dm diet  Diabetes Assessment    Medic Alert ID: No  Meal Planning: Avoidance of concentrated sweets   How often do you test your blood sugar?: Other (Comment: as needed)   Do you have barriers with adherence to non-pharmacologic self-management interventions? (Nutrition/Exercise/Self-Monitoring): Yes   Have you ever had to go to the ED for symptoms of low blood sugar?: No       No patient-reported symptoms   Do you have hyperglycemia symptoms?: No   Do you have hypoglycemia symptoms?: No   Blood Sugar Monitoring Regimen: Not Testing                  Offered patient enrollment in the Remote Patient Monitoring (RPM) program for in-home monitoring: No needs at this time. .    Lab Results       None            Care Coordination Interventions    Referral from Primary Care Provider: No  Suggested Interventions and Community Resources  Diabetes Education:  In Process  Registered Dietician: Declined  Transportation Support: Completed          Goals Addressed                      This Visit's Progress      Patient Stated (pt-stated) Will watch carbs/sweets    Barriers: lack of support, stress, and lack of education  Plan for overcoming my barriers: will work with ACM  Confidence: 8/10  Anticipated Goal Completion Date: 5/1/23        COMPLETED: Self Monitoring         Self-Monitored Blood Glucose - I will check my blood sugar Fasting blood sugar  I will notify my provider of any trends of increasing or decreasing blood sugars over a 1 month period. I will notify my provider if I have any blood sugar readings less than 70 more than 2 times a month. None Recently Recorded    Barriers: lack of support and overwhelmed by complexity of regimen  Plan for overcoming my barriers: ACM and PCP  Confidence: 8/10  Anticipated Goal Completion Date: 6.3.22    Patient notes she is not able to test BS due to not having testing supplies. ACM will reach out to PCP to have this sent to pharmacy. 5.3. 22AC                Prior to Admission medications    Medication Sig Start Date End Date Taking?  Authorizing Provider   Continuous Blood Gluc Transmit (DEXCOM G6 TRANSMITTER) MISC Monitor blood sugar daily 11/22/22   INA Haely CNP   Continuous Blood Gluc Sensor (DEXCOM G6 SENSOR) MISC Monitor blood sugar daily 11/22/22   INA Jackson CNP   Continuous Blood Gluc  (DEXCOM G6 ) FRANCIS Monitor blood sugar daily 11/22/22   INA Jackson CNP   gemfibrozil (LOPID) 600 MG tablet Take 1 tablet by mouth 2 times daily 11/22/22   INA Healy CNP   atorvastatin (LIPITOR) 20 MG tablet Take 1 tablet by mouth daily 11/22/22   INA Healy CNP   glipiZIDE (GLUCOTROL XL) 10 MG extended release tablet Take 1 tablet by mouth daily 11/22/22   INA Healy CNP   metFORMIN (GLUCOPHAGE) 500 MG tablet Take 2 tablets by mouth 2 times daily (with meals) 11/22/22   INA Jackson CNP   metoprolol tartrate (LOPRESSOR) 50 MG tablet Take 1 tablet by mouth 2 times daily 11/22/22   Erma INA Anderson CNP   PARoxetine (PAXIL) 20 MG tablet Take 1 tablet by mouth every morning 11/22/22   Robin Days, INA Mercado CNP   blood glucose test strips (TRUE METRIX BLOOD GLUCOSE TEST) strip 1 each by In Vitro route daily As needed. 11/22/22   INA Croft CNP   Lancets MISC 1 each by Does not apply route 2 times daily 11/22/22   Robin Days, INA Mercado CNP   blood glucose monitor kit and supplies Dispense sufficient amount for indicated testing frequency plus additional to accommodate PRN testing needs. Dispense all needed supplies to include: monitor, strips, lancing device, lancets, control solutions, alcohol swabs.  9/16/22   INA Croft CNP   nystatin (MYCOSTATIN) 371984 UNIT/GM cream Apply topically as needed  1/20/22   Historical Provider, MD       Future Appointments   Date Time Provider Tyler Torre   3/16/2023  2:00 PM Erma Jinx Nazia, APRN - CNP [facility] [place] [facility] [place]

## 2022-12-08 ENCOUNTER — CARE COORDINATION (OUTPATIENT)
Dept: CARE COORDINATION | Age: 67
End: 2022-12-08

## 2022-12-08 NOTE — CARE COORDINATION
Ambulatory Care Coordination Note  12/8/2022    ACC: Luther Murray, RN    Spoke with pt. Reports she is starting to go to another PCP with [facility]. Has appt this Thursday. Reports this works best for her and her daughter as it is within walking distance. Agreeable to dc since will be changing providers due to location. Offered patient enrollment in the Remote Patient Monitoring (RPM) program for in-home monitoring: Patient declined and changing PCP. Lab Results       None            Care Coordination Interventions    Referral from Primary Care Provider: No  Suggested Interventions and Community Resources  Diabetes Education: In Process  Registered Dietician: Declined  Transportation Support: Completed          Goals Addressed                      This Visit's Progress      COMPLETED: Conditions and Symptoms         I will schedule office visits, as directed by my provider. I will keep my appointment or reschedule if I have to cancel. I will notify my provider of any barriers to my plan of care. I will follow my Zone Management tool to seek urgent or emergent care. I will notify my provider of any symptoms that indicate a worsening of my condition. Barriers: lack of motivation and lack of support  Plan for overcoming my barriers: PCP and ACM  Confidence: 8/10  Anticipated Goal Completion Date: 6.3.22          COMPLETED: Patient Stated (pt-stated)         Will watch carbs/sweets    Barriers: lack of support, stress, and lack of education  Plan for overcoming my barriers: will work with ACM  Confidence: 8/10  Anticipated Goal Completion Date: 5/1/23              Prior to Admission medications    Medication Sig Start Date End Date Taking?  Authorizing Provider   Continuous Blood Gluc Transmit (DEXCOM G6 TRANSMITTER) MISC Monitor blood sugar daily 11/22/22   INA Martinez - CNP   Continuous Blood Gluc Sensor (DEXCOM G6 SENSOR) MISC Monitor blood sugar daily 11/22/22   Erma Trivedi APRN - CNP   Continuous Blood Gluc  (DEXCOM G6 ) FRANCIS Monitor blood sugar daily 11/22/22   [facility], APRN - CNP   gemfibrozil (LOPID) 600 MG tablet Take 1 tablet by mouth 2 times daily 11/22/22   [facility], INA Mercado CNP   atorvastatin (LIPITOR) 20 MG tablet Take 1 tablet by mouth daily 11/22/22   [facility], APRN - CNP   glipiZIDE (GLUCOTROL XL) 10 MG extended release tablet Take 1 tablet by mouth daily 11/22/22   [facility], APRN - CNP   metFORMIN (GLUCOPHAGE) 500 MG tablet Take 2 tablets by mouth 2 times daily (with meals) 11/22/22   INA Valdez CNP   metoprolol tartrate (LOPRESSOR) 50 MG tablet Take 1 tablet by mouth 2 times daily 11/22/22   [facility], APRN - CNP   PARoxetine (PAXIL) 20 MG tablet Take 1 tablet by mouth every morning 11/22/22   [facility], INA Mercado CNP   blood glucose test strips (TRUE METRIX BLOOD GLUCOSE TEST) strip 1 each by In Vitro route daily As needed. 11/22/22   INA Valdez CNP   Lancets MISC 1 each by Does not apply route 2 times daily 11/22/22   [facility], INA Mercado CNP   blood glucose monitor kit and supplies Dispense sufficient amount for indicated testing frequency plus additional to accommodate PRN testing needs. Dispense all needed supplies to include: monitor, strips, lancing device, lancets, control solutions, alcohol swabs.  9/16/22   INA Valdez CNP   nystatin (MYCOSTATIN) [id number] UNIT/GM cream Apply topically as needed  1/20/22   Historical Provider, MD       Future Appointments   Date Time Provider Tyler Torre   3/16/2023  2:00 PM Erma Emerita Ravel, APRN - CNP [facility] [place] [facility] [place]

## 2023-02-08 LAB — MAMMOGRAPHY, EXTERNAL: NORMAL

## 2023-06-07 ENCOUNTER — COMMUNITY OUTREACH (OUTPATIENT)
Dept: FAMILY MEDICINE CLINIC | Age: 68
End: 2023-06-07

## 2023-06-07 NOTE — PROGRESS NOTES
Patient's HM shows they are overdue for Mammogram and Colorectal Screening. Care Everywhere and  files searched.    updated with 2023 mammogram.

## 2023-07-17 RX ORDER — ISOPROPYL ALCOHOL 70 ML/100ML
SWAB TOPICAL
OUTPATIENT
Start: 2023-07-17

## 2023-08-16 LAB
AVERAGE GLUCOSE: NORMAL
HBA1C MFR BLD: 6.5 %

## 2023-10-14 DIAGNOSIS — E11.65 TYPE 2 DIABETES MELLITUS WITH HYPERGLYCEMIA, WITHOUT LONG-TERM CURRENT USE OF INSULIN (HCC): ICD-10-CM

## 2023-10-16 RX ORDER — GLUCOSAM/CHON-MSM1/C/MANG/BOSW 500-416.6
TABLET ORAL
Qty: 200 EACH | Refills: 0 | Status: SHIPPED | OUTPATIENT
Start: 2023-10-16

## 2023-10-16 RX ORDER — CALCIUM CITRATE/VITAMIN D3 200MG-6.25
TABLET ORAL
Qty: 200 STRIP | Refills: 0 | Status: SHIPPED | OUTPATIENT
Start: 2023-10-16